# Patient Record
Sex: FEMALE | Race: OTHER | HISPANIC OR LATINO
[De-identification: names, ages, dates, MRNs, and addresses within clinical notes are randomized per-mention and may not be internally consistent; named-entity substitution may affect disease eponyms.]

---

## 2018-01-13 PROBLEM — M25.569 KNEE PAIN: Status: ACTIVE | Noted: 2018-01-13

## 2018-01-17 ENCOUNTER — APPOINTMENT (OUTPATIENT)
Dept: ORTHOPEDIC SURGERY | Facility: CLINIC | Age: 73
End: 2018-01-17
Payer: MEDICAID

## 2018-01-17 VITALS
TEMPERATURE: 98.4 F | DIASTOLIC BLOOD PRESSURE: 92 MMHG | SYSTOLIC BLOOD PRESSURE: 158 MMHG | BODY MASS INDEX: 34.96 KG/M2 | HEIGHT: 62 IN | HEART RATE: 83 BPM | WEIGHT: 190 LBS

## 2018-01-17 DIAGNOSIS — M25.569 PAIN IN UNSPECIFIED KNEE: ICD-10-CM

## 2018-01-17 PROCEDURE — 99205 OFFICE O/P NEW HI 60 MIN: CPT

## 2018-01-17 PROCEDURE — 73562 X-RAY EXAM OF KNEE 3: CPT | Mod: RT

## 2018-01-29 ENCOUNTER — OUTPATIENT (OUTPATIENT)
Dept: OUTPATIENT SERVICES | Facility: HOSPITAL | Age: 73
LOS: 1 days | End: 2018-01-29
Payer: COMMERCIAL

## 2018-01-29 VITALS
DIASTOLIC BLOOD PRESSURE: 82 MMHG | HEART RATE: 68 BPM | SYSTOLIC BLOOD PRESSURE: 146 MMHG | RESPIRATION RATE: 16 BRPM | HEIGHT: 62 IN | TEMPERATURE: 97 F | WEIGHT: 191.58 LBS

## 2018-01-29 DIAGNOSIS — M17.12 UNILATERAL PRIMARY OSTEOARTHRITIS, LEFT KNEE: ICD-10-CM

## 2018-01-29 DIAGNOSIS — I10 ESSENTIAL (PRIMARY) HYPERTENSION: ICD-10-CM

## 2018-01-29 DIAGNOSIS — Z01.818 ENCOUNTER FOR OTHER PREPROCEDURAL EXAMINATION: ICD-10-CM

## 2018-01-29 DIAGNOSIS — Z98.41 CATARACT EXTRACTION STATUS, RIGHT EYE: Chronic | ICD-10-CM

## 2018-01-29 LAB
ALBUMIN SERPL ELPH-MCNC: 4.5 G/DL — SIGNIFICANT CHANGE UP (ref 3.3–5.2)
ALP SERPL-CCNC: 72 U/L — SIGNIFICANT CHANGE UP (ref 40–120)
ALT FLD-CCNC: 16 U/L — SIGNIFICANT CHANGE UP
ANION GAP SERPL CALC-SCNC: 15 MMOL/L — SIGNIFICANT CHANGE UP (ref 5–17)
APTT BLD: 30.6 SEC — SIGNIFICANT CHANGE UP (ref 27.5–37.4)
AST SERPL-CCNC: 18 U/L — SIGNIFICANT CHANGE UP
BASOPHILS # BLD AUTO: 0 K/UL — SIGNIFICANT CHANGE UP (ref 0–0.2)
BASOPHILS NFR BLD AUTO: 0.4 % — SIGNIFICANT CHANGE UP (ref 0–2)
BILIRUB SERPL-MCNC: 0.2 MG/DL — LOW (ref 0.4–2)
BLD GP AB SCN SERPL QL: SIGNIFICANT CHANGE UP
BUN SERPL-MCNC: 20 MG/DL — SIGNIFICANT CHANGE UP (ref 8–20)
CALCIUM SERPL-MCNC: 9.7 MG/DL — SIGNIFICANT CHANGE UP (ref 8.6–10.2)
CHLORIDE SERPL-SCNC: 101 MMOL/L — SIGNIFICANT CHANGE UP (ref 98–107)
CO2 SERPL-SCNC: 27 MMOL/L — SIGNIFICANT CHANGE UP (ref 22–29)
CREAT SERPL-MCNC: 0.79 MG/DL — SIGNIFICANT CHANGE UP (ref 0.5–1.3)
EOSINOPHIL # BLD AUTO: 0.1 K/UL — SIGNIFICANT CHANGE UP (ref 0–0.5)
EOSINOPHIL NFR BLD AUTO: 1.7 % — SIGNIFICANT CHANGE UP (ref 0–6)
GLUCOSE SERPL-MCNC: 119 MG/DL — HIGH (ref 70–115)
HCT VFR BLD CALC: 40.6 % — SIGNIFICANT CHANGE UP (ref 37–47)
HGB BLD-MCNC: 13.7 G/DL — SIGNIFICANT CHANGE UP (ref 12–16)
INR BLD: 1.01 RATIO — SIGNIFICANT CHANGE UP (ref 0.88–1.16)
LYMPHOCYTES # BLD AUTO: 2 K/UL — SIGNIFICANT CHANGE UP (ref 1–4.8)
LYMPHOCYTES # BLD AUTO: 26.4 % — SIGNIFICANT CHANGE UP (ref 20–55)
MCHC RBC-ENTMCNC: 29 PG — SIGNIFICANT CHANGE UP (ref 27–31)
MCHC RBC-ENTMCNC: 33.7 G/DL — SIGNIFICANT CHANGE UP (ref 32–36)
MCV RBC AUTO: 86 FL — SIGNIFICANT CHANGE UP (ref 81–99)
MONOCYTES # BLD AUTO: 0.6 K/UL — SIGNIFICANT CHANGE UP (ref 0–0.8)
MONOCYTES NFR BLD AUTO: 7.1 % — SIGNIFICANT CHANGE UP (ref 3–10)
MRSA PCR RESULT.: SIGNIFICANT CHANGE UP
NEUTROPHILS # BLD AUTO: 5 K/UL — SIGNIFICANT CHANGE UP (ref 1.8–8)
NEUTROPHILS NFR BLD AUTO: 64.3 % — SIGNIFICANT CHANGE UP (ref 37–73)
PLATELET # BLD AUTO: 298 K/UL — SIGNIFICANT CHANGE UP (ref 150–400)
POTASSIUM SERPL-MCNC: 4.1 MMOL/L — SIGNIFICANT CHANGE UP (ref 3.5–5.3)
POTASSIUM SERPL-SCNC: 4.1 MMOL/L — SIGNIFICANT CHANGE UP (ref 3.5–5.3)
PROT SERPL-MCNC: 7.9 G/DL — SIGNIFICANT CHANGE UP (ref 6.6–8.7)
PROTHROM AB SERPL-ACNC: 11.1 SEC — SIGNIFICANT CHANGE UP (ref 9.8–12.7)
RBC # BLD: 4.72 M/UL — SIGNIFICANT CHANGE UP (ref 4.4–5.2)
RBC # FLD: 13.7 % — SIGNIFICANT CHANGE UP (ref 11–15.6)
S AUREUS DNA NOSE QL NAA+PROBE: SIGNIFICANT CHANGE UP
SODIUM SERPL-SCNC: 143 MMOL/L — SIGNIFICANT CHANGE UP (ref 135–145)
TYPE + AB SCN PNL BLD: SIGNIFICANT CHANGE UP
WBC # BLD: 7.8 K/UL — SIGNIFICANT CHANGE UP (ref 4.8–10.8)
WBC # FLD AUTO: 7.8 K/UL — SIGNIFICANT CHANGE UP (ref 4.8–10.8)

## 2018-01-29 PROCEDURE — 87640 STAPH A DNA AMP PROBE: CPT

## 2018-01-29 PROCEDURE — 86850 RBC ANTIBODY SCREEN: CPT

## 2018-01-29 PROCEDURE — 86901 BLOOD TYPING SEROLOGIC RH(D): CPT

## 2018-01-29 PROCEDURE — 85027 COMPLETE CBC AUTOMATED: CPT

## 2018-01-29 PROCEDURE — 93005 ELECTROCARDIOGRAM TRACING: CPT

## 2018-01-29 PROCEDURE — 93010 ELECTROCARDIOGRAM REPORT: CPT

## 2018-01-29 PROCEDURE — G0463: CPT

## 2018-01-29 PROCEDURE — 87641 MR-STAPH DNA AMP PROBE: CPT

## 2018-01-29 PROCEDURE — 85610 PROTHROMBIN TIME: CPT

## 2018-01-29 PROCEDURE — 85730 THROMBOPLASTIN TIME PARTIAL: CPT

## 2018-01-29 PROCEDURE — 86900 BLOOD TYPING SEROLOGIC ABO: CPT

## 2018-01-29 PROCEDURE — 36415 COLL VENOUS BLD VENIPUNCTURE: CPT

## 2018-01-29 PROCEDURE — 80053 COMPREHEN METABOLIC PANEL: CPT

## 2018-01-29 NOTE — H&P PST ADULT - NSANTHOSAYNRD_GEN_A_CORE
No. ERI screening performed.  STOP BANG Legend: 0-2 = LOW Risk; 3-4 = INTERMEDIATE Risk; 5-8 = HIGH Risk

## 2018-01-29 NOTE — PATIENT PROFILE ADULT. - LEARNING ASSESSMENT (PATIENT) ADDITIONAL COMMENTS
pre-op instructions, surgical wash, MRSA/MSSA & pain management reviewed. Pt verbalized understanding . Written material provided in Thai

## 2018-02-04 RX ORDER — OXYCODONE HYDROCHLORIDE 5 MG/1
10 TABLET ORAL ONCE
Qty: 0 | Refills: 0 | Status: DISCONTINUED | OUTPATIENT
Start: 2018-02-12 | End: 2018-02-12

## 2018-02-04 RX ORDER — GABAPENTIN 400 MG/1
600 CAPSULE ORAL ONCE
Qty: 0 | Refills: 0 | Status: COMPLETED | OUTPATIENT
Start: 2018-02-12 | End: 2018-02-12

## 2018-02-07 ENCOUNTER — APPOINTMENT (OUTPATIENT)
Dept: CARDIOLOGY | Facility: CLINIC | Age: 73
End: 2018-02-07
Payer: MEDICAID

## 2018-02-07 VITALS — RESPIRATION RATE: 16 BRPM | SYSTOLIC BLOOD PRESSURE: 160 MMHG | HEART RATE: 77 BPM | DIASTOLIC BLOOD PRESSURE: 82 MMHG

## 2018-02-07 VITALS — HEIGHT: 61 IN | BODY MASS INDEX: 37 KG/M2 | WEIGHT: 196 LBS

## 2018-02-07 DIAGNOSIS — Z01.810 ENCOUNTER FOR PREPROCEDURAL CARDIOVASCULAR EXAMINATION: ICD-10-CM

## 2018-02-07 PROCEDURE — 99205 OFFICE O/P NEW HI 60 MIN: CPT

## 2018-02-07 PROCEDURE — 93000 ELECTROCARDIOGRAM COMPLETE: CPT

## 2018-02-07 PROCEDURE — 93306 TTE W/DOPPLER COMPLETE: CPT

## 2018-02-11 ENCOUNTER — FORM ENCOUNTER (OUTPATIENT)
Age: 73
End: 2018-02-11

## 2018-02-12 ENCOUNTER — TRANSCRIPTION ENCOUNTER (OUTPATIENT)
Age: 73
End: 2018-02-12

## 2018-02-12 ENCOUNTER — APPOINTMENT (OUTPATIENT)
Dept: ORTHOPEDIC SURGERY | Facility: HOSPITAL | Age: 73
End: 2018-02-12

## 2018-02-12 ENCOUNTER — INPATIENT (INPATIENT)
Facility: HOSPITAL | Age: 73
LOS: 1 days | Discharge: HOME | DRG: 470 | End: 2018-02-14
Attending: ORTHOPAEDIC SURGERY | Admitting: ORTHOPAEDIC SURGERY
Payer: COMMERCIAL

## 2018-02-12 VITALS
HEART RATE: 76 BPM | SYSTOLIC BLOOD PRESSURE: 165 MMHG | TEMPERATURE: 98 F | HEIGHT: 61 IN | DIASTOLIC BLOOD PRESSURE: 88 MMHG | WEIGHT: 190.04 LBS | OXYGEN SATURATION: 100 %

## 2018-02-12 DIAGNOSIS — Z98.41 CATARACT EXTRACTION STATUS, RIGHT EYE: Chronic | ICD-10-CM

## 2018-02-12 DIAGNOSIS — M17.12 UNILATERAL PRIMARY OSTEOARTHRITIS, LEFT KNEE: ICD-10-CM

## 2018-02-12 DIAGNOSIS — Z00.00 ENCOUNTER FOR GENERAL ADULT MEDICAL EXAMINATION WITHOUT ABNORMAL FINDINGS: ICD-10-CM

## 2018-02-12 DIAGNOSIS — Z29.9 ENCOUNTER FOR PROPHYLACTIC MEASURES, UNSPECIFIED: ICD-10-CM

## 2018-02-12 DIAGNOSIS — Z96.652 PRESENCE OF LEFT ARTIFICIAL KNEE JOINT: ICD-10-CM

## 2018-02-12 DIAGNOSIS — I10 ESSENTIAL (PRIMARY) HYPERTENSION: ICD-10-CM

## 2018-02-12 DIAGNOSIS — H26.9 UNSPECIFIED CATARACT: ICD-10-CM

## 2018-02-12 LAB — ABO RH CONFIRMATION: SIGNIFICANT CHANGE UP

## 2018-02-12 PROCEDURE — 27447 TOTAL KNEE ARTHROPLASTY: CPT | Mod: AS,LT

## 2018-02-12 PROCEDURE — 27447 TOTAL KNEE ARTHROPLASTY: CPT | Mod: LT

## 2018-02-12 PROCEDURE — 88311 DECALCIFY TISSUE: CPT | Mod: 26

## 2018-02-12 PROCEDURE — 73560 X-RAY EXAM OF KNEE 1 OR 2: CPT | Mod: 26,LT

## 2018-02-12 PROCEDURE — 88305 TISSUE EXAM BY PATHOLOGIST: CPT | Mod: 26

## 2018-02-12 PROCEDURE — 20985 CPTR-ASST DIR MS PX: CPT

## 2018-02-12 PROCEDURE — 99232 SBSQ HOSP IP/OBS MODERATE 35: CPT

## 2018-02-12 PROCEDURE — 20985 CPTR-ASST DIR MS PX: CPT | Mod: AS

## 2018-02-12 RX ORDER — ACETAMINOPHEN 500 MG
975 TABLET ORAL EVERY 8 HOURS
Qty: 0 | Refills: 0 | Status: DISCONTINUED | OUTPATIENT
Start: 2018-02-12 | End: 2018-02-14

## 2018-02-12 RX ORDER — ASCORBIC ACID 60 MG
500 TABLET,CHEWABLE ORAL
Qty: 0 | Refills: 0 | Status: DISCONTINUED | OUTPATIENT
Start: 2018-02-12 | End: 2018-02-14

## 2018-02-12 RX ORDER — ONDANSETRON 8 MG/1
4 TABLET, FILM COATED ORAL ONCE
Qty: 0 | Refills: 0 | Status: DISCONTINUED | OUTPATIENT
Start: 2018-02-12 | End: 2018-02-12

## 2018-02-12 RX ORDER — VANCOMYCIN HCL 1 G
1250 VIAL (EA) INTRAVENOUS
Qty: 0 | Refills: 0 | Status: COMPLETED | OUTPATIENT
Start: 2018-02-12 | End: 2018-02-13

## 2018-02-12 RX ORDER — VALSARTAN 80 MG/1
160 TABLET ORAL DAILY
Qty: 0 | Refills: 0 | Status: DISCONTINUED | OUTPATIENT
Start: 2018-02-12 | End: 2018-02-14

## 2018-02-12 RX ORDER — SODIUM CHLORIDE 9 MG/ML
1000 INJECTION, SOLUTION INTRAVENOUS
Qty: 0 | Refills: 0 | Status: DISCONTINUED | OUTPATIENT
Start: 2018-02-12 | End: 2018-02-14

## 2018-02-12 RX ORDER — DOCUSATE SODIUM 100 MG
100 CAPSULE ORAL THREE TIMES A DAY
Qty: 0 | Refills: 0 | Status: DISCONTINUED | OUTPATIENT
Start: 2018-02-12 | End: 2018-02-14

## 2018-02-12 RX ORDER — CEFAZOLIN SODIUM 1 G
2000 VIAL (EA) INJECTION
Qty: 0 | Refills: 0 | Status: COMPLETED | OUTPATIENT
Start: 2018-02-12 | End: 2018-02-13

## 2018-02-12 RX ORDER — OXYCODONE HYDROCHLORIDE 5 MG/1
10 TABLET ORAL
Qty: 0 | Refills: 0 | Status: DISCONTINUED | OUTPATIENT
Start: 2018-02-12 | End: 2018-02-14

## 2018-02-12 RX ORDER — OXYCODONE HYDROCHLORIDE 5 MG/1
5 TABLET ORAL
Qty: 0 | Refills: 0 | Status: DISCONTINUED | OUTPATIENT
Start: 2018-02-12 | End: 2018-02-14

## 2018-02-12 RX ORDER — MAGNESIUM HYDROXIDE 400 MG/1
30 TABLET, CHEWABLE ORAL DAILY
Qty: 0 | Refills: 0 | Status: DISCONTINUED | OUTPATIENT
Start: 2018-02-12 | End: 2018-02-14

## 2018-02-12 RX ORDER — SENNA PLUS 8.6 MG/1
2 TABLET ORAL AT BEDTIME
Qty: 0 | Refills: 0 | Status: DISCONTINUED | OUTPATIENT
Start: 2018-02-12 | End: 2018-02-14

## 2018-02-12 RX ORDER — SODIUM CHLORIDE 9 MG/ML
1000 INJECTION, SOLUTION INTRAVENOUS
Qty: 0 | Refills: 0 | Status: DISCONTINUED | OUTPATIENT
Start: 2018-02-12 | End: 2018-02-12

## 2018-02-12 RX ORDER — TRANEXAMIC ACID 100 MG/ML
870 INJECTION, SOLUTION INTRAVENOUS ONCE
Qty: 0 | Refills: 0 | Status: DISCONTINUED | OUTPATIENT
Start: 2018-02-12 | End: 2018-02-12

## 2018-02-12 RX ORDER — ACETAMINOPHEN 500 MG
1000 TABLET ORAL
Qty: 0 | Refills: 0 | Status: COMPLETED | OUTPATIENT
Start: 2018-02-12 | End: 2018-02-12

## 2018-02-12 RX ORDER — ASPIRIN/CALCIUM CARB/MAGNESIUM 324 MG
325 TABLET ORAL
Qty: 0 | Refills: 0 | Status: DISCONTINUED | OUTPATIENT
Start: 2018-02-12 | End: 2018-02-14

## 2018-02-12 RX ORDER — OXYCODONE HYDROCHLORIDE 5 MG/1
10 TABLET ORAL EVERY 12 HOURS
Qty: 0 | Refills: 0 | Status: DISCONTINUED | OUTPATIENT
Start: 2018-02-12 | End: 2018-02-14

## 2018-02-12 RX ORDER — ACETAMINOPHEN 500 MG
1000 TABLET ORAL ONCE
Qty: 0 | Refills: 0 | Status: DISCONTINUED | OUTPATIENT
Start: 2018-02-12 | End: 2018-02-12

## 2018-02-12 RX ORDER — OFLOXACIN 0.3 %
1 DROPS OPHTHALMIC (EYE) DAILY
Qty: 0 | Refills: 0 | Status: DISCONTINUED | OUTPATIENT
Start: 2018-02-12 | End: 2018-02-13

## 2018-02-12 RX ORDER — AMLODIPINE BESYLATE 2.5 MG/1
5 TABLET ORAL DAILY
Qty: 0 | Refills: 0 | Status: DISCONTINUED | OUTPATIENT
Start: 2018-02-12 | End: 2018-02-14

## 2018-02-12 RX ORDER — ONDANSETRON 8 MG/1
4 TABLET, FILM COATED ORAL EVERY 6 HOURS
Qty: 0 | Refills: 0 | Status: DISCONTINUED | OUTPATIENT
Start: 2018-02-12 | End: 2018-02-14

## 2018-02-12 RX ORDER — FERROUS SULFATE 325(65) MG
325 TABLET ORAL DAILY
Qty: 0 | Refills: 0 | Status: DISCONTINUED | OUTPATIENT
Start: 2018-02-12 | End: 2018-02-14

## 2018-02-12 RX ORDER — HYDROMORPHONE HYDROCHLORIDE 2 MG/ML
0.5 INJECTION INTRAMUSCULAR; INTRAVENOUS; SUBCUTANEOUS
Qty: 0 | Refills: 0 | Status: DISCONTINUED | OUTPATIENT
Start: 2018-02-12 | End: 2018-02-14

## 2018-02-12 RX ORDER — POLYETHYLENE GLYCOL 3350 17 G/17G
17 POWDER, FOR SOLUTION ORAL DAILY
Qty: 0 | Refills: 0 | Status: DISCONTINUED | OUTPATIENT
Start: 2018-02-12 | End: 2018-02-14

## 2018-02-12 RX ORDER — CEFAZOLIN SODIUM 1 G
2000 VIAL (EA) INJECTION ONCE
Qty: 0 | Refills: 0 | Status: DISCONTINUED | OUTPATIENT
Start: 2018-02-12 | End: 2018-02-12

## 2018-02-12 RX ORDER — CELECOXIB 200 MG/1
400 CAPSULE ORAL ONCE
Qty: 0 | Refills: 0 | Status: COMPLETED | OUTPATIENT
Start: 2018-02-12 | End: 2018-02-12

## 2018-02-12 RX ORDER — FENTANYL CITRATE 50 UG/ML
50 INJECTION INTRAVENOUS
Qty: 0 | Refills: 0 | Status: DISCONTINUED | OUTPATIENT
Start: 2018-02-12 | End: 2018-02-12

## 2018-02-12 RX ORDER — VANCOMYCIN HCL 1 G
1250 VIAL (EA) INTRAVENOUS ONCE
Qty: 0 | Refills: 0 | Status: COMPLETED | OUTPATIENT
Start: 2018-02-12 | End: 2018-02-12

## 2018-02-12 RX ORDER — FOLIC ACID 0.8 MG
1 TABLET ORAL DAILY
Qty: 0 | Refills: 0 | Status: DISCONTINUED | OUTPATIENT
Start: 2018-02-12 | End: 2018-02-14

## 2018-02-12 RX ORDER — CELECOXIB 200 MG/1
200 CAPSULE ORAL
Qty: 0 | Refills: 0 | Status: DISCONTINUED | OUTPATIENT
Start: 2018-02-14 | End: 2018-02-14

## 2018-02-12 RX ORDER — BUPIVACAINE 13.3 MG/ML
20 INJECTION, SUSPENSION, LIPOSOMAL INFILTRATION ONCE
Qty: 0 | Refills: 0 | Status: DISCONTINUED | OUTPATIENT
Start: 2018-02-12 | End: 2018-02-12

## 2018-02-12 RX ADMIN — CELECOXIB 400 MILLIGRAM(S): 200 CAPSULE ORAL at 11:25

## 2018-02-12 RX ADMIN — Medication 500 MILLIGRAM(S): at 19:17

## 2018-02-12 RX ADMIN — Medication 100 MILLIGRAM(S): at 23:16

## 2018-02-12 RX ADMIN — OXYCODONE HYDROCHLORIDE 10 MILLIGRAM(S): 5 TABLET ORAL at 11:24

## 2018-02-12 RX ADMIN — CELECOXIB 400 MILLIGRAM(S): 200 CAPSULE ORAL at 11:24

## 2018-02-12 RX ADMIN — OXYCODONE HYDROCHLORIDE 10 MILLIGRAM(S): 5 TABLET ORAL at 19:17

## 2018-02-12 RX ADMIN — GABAPENTIN 600 MILLIGRAM(S): 400 CAPSULE ORAL at 11:24

## 2018-02-12 RX ADMIN — Medication 400 MILLIGRAM(S): at 23:16

## 2018-02-12 RX ADMIN — Medication 325 MILLIGRAM(S): at 19:17

## 2018-02-12 RX ADMIN — Medication 100 MILLIGRAM(S): at 20:03

## 2018-02-12 RX ADMIN — OXYCODONE HYDROCHLORIDE 10 MILLIGRAM(S): 5 TABLET ORAL at 19:19

## 2018-02-12 RX ADMIN — Medication 166.67 MILLIGRAM(S): at 12:00

## 2018-02-12 NOTE — CONSULT NOTE ADULT - SUBJECTIVE AND OBJECTIVE BOX
PMD :  Cardio :     Patient is a 72y old  Female who presents with a chief complaint of L knee pain /oa for elective L TKA     CC: L knee pain     HPI:  73 yo female with left knee pain for many years,  S/P L TKA , POD # 0 , seen on PACU     PAST MEDICAL & SURGICAL HISTORY:  Risk factors for obstructive sleep apnea  OA (osteoarthritis)  Hypertension  S/P cataract extraction, right     Social History:  · Marital Status	Single  · Lives With	children     Substance Use History:  · Substance Use	caffeine  · Caffeine Type	coffee  · Caffeine Amount/Frequency	1-2 cups/cans per day     Alcohol Use History:  · Have you ever consumed alcohol	never     Tobacco Usage:  · Tobacco Usage: Never smoker    FAMILY HISTORY:  No pertinent family history in first degree relatives      Allergies    No Known Allergies        HOME MEDICATIONS :   · 	valsartan 160 mg oral tablet: Last Dose Taken:  , 1 tab(s) orally once a day  · 	amLODIPine 5 mg oral tablet: Last Dose Taken:  , 1 tab(s) orally once a day  · 	PREDNISOLONE ROVERTO 1% OP: Last Dose Taken:    · 	OFLOXACIN    SPENCER 0.3% OP: Last Dose Taken:    · 	Omega-3 oral capsule: Last Dose Taken:  , orally once a day    REVIEW OF SYSTEMS:    CONSTITUTIONAL: No fever, weight loss, or fatigue  EYES: No eye pain, visual disturbances, or discharge  NECK: No pain or stiffness  RESPIRATORY: No cough, wheezing, chills or hemoptysis; No shortness of breath  CARDIOVASCULAR: No chest pain, palpitations, dizziness, or leg swelling  GASTROINTESTINAL: No abdominal or epigastric pain. No nausea, vomiting, or hematemesis; No diarrhea or constipation. No melena or hematochezia.  GENITOURINARY: No dysuria, frequency, hematuria, or incontinence  NEUROLOGICAL: No headaches, memory loss, loss of strength, numbness, or tremors  SKIN: No itching, burning, rashes, or lesions   LYMPH NODES: No enlarged glands  ENDOCRINE: No heat or cold intolerance; No hair loss  MUSCULOSKELETAL: L knee pain  PSYCHIATRIC: No depression, anxiety, mood swings, or difficulty sleeping  HEME/LYMPH: No easy bruising, or bleeding gums  ALLERGY AND IMMUNOLOGIC: No hives or eczema    MEDICATIONS  (STANDING):  ceFAZolin   IVPB 2000 milliGRAM(s) IV Intermittent <User Schedule>  lactated ringers. 1000 milliLiter(s) (125 mL/Hr) IV Continuous <Continuous>  vancomycin  IVPB 1250 milliGRAM(s) IV Intermittent <User Schedule>    MEDICATIONS  (PRN):  fentaNYL    Injectable 50 MICROGram(s) IV Push every 5 minutes PRN Severe Pain  ondansetron Injectable 4 milliGRAM(s) IV Push once PRN Nausea and/or Vomiting  promethazine IVPB 6.25 milliGRAM(s) IV Intermittent once PRN Nausea and/or Vomiting      Vital Signs Last 24 Hrs  T(C): 36.3 (12 Feb 2018 14:15), Max: 36.8 (12 Feb 2018 10:43)  T(F): 97.3 (12 Feb 2018 14:15), Max: 98.2 (12 Feb 2018 10:43)  HR: 81 (12 Feb 2018 14:45) (76 - 81)  BP: 106/54 (12 Feb 2018 14:45) (85/42 - 165/88)  BP(mean): --  RR: 19 (12 Feb 2018 14:45) (16 - 20)  SpO2: 98% (12 Feb 2018 14:45) (98% - 100%)    PHYSICAL EXAM:    GENERAL: NAD, well-groomed, well-developed  HEAD:  Atraumatic, Normocephalic  EYES: EOMI, PERRLA, conjunctiva and sclera clear  NECK: Supple, No JVD, Normal thyroid  NERVOUS SYSTEM:  Alert & Oriented X3, Good concentration; Motor Strength 5/5 B/L upper and lower extremities; DTRs 2+ intact and symmetric  CHEST/LUNG: CTA  b/l,  no rales, rhonchi, wheezing, or rubs  HEART: Regular rate and rhythm; No murmurs, rubs, or gallops  ABDOMEN: Soft, Nontender, Nondistended; Bowel sounds present  EXTREMITIES:  2+ Peripheral Pulses, No clubbing, cyanosis, or edema , L knee dressing + , clean and dry   LYMPH: No lymphadenopathy noted  SKIN: No rashes or lesions    LABS: Pending    RADIOLOGY & ADDITIONAL STUDIES: PMD : Ray   Cardio : Sedrick    Patient is a 72y old  Female who presents with a chief complaint of L knee pain /oa for elective L TKA     CC: L knee pain     HPI:  73 yo female  Armenian speaking only with left knee pain for many years, had cortisone inj in the past , was taking over counter pain meds and failed conservative mgmt , now S/P L TKA , POD # 0 , seen on PACU     PAST MEDICAL & SURGICAL HISTORY:  Risk factors for obstructive sleep apnea  OA (osteoarthritis)  Hypertension  S/P cataract extraction, right     Social History:  · Marital Status	Single  · Lives With	children     Substance Use History:  · Substance Use	caffeine  · Caffeine Type	coffee  · Caffeine Amount/Frequency	1-2 cups/cans per day     Alcohol Use History:  · Have you ever consumed alcohol	never     Tobacco Usage:  · Tobacco Usage: Never smoker    FAMILY HISTORY:  No pertinent family history in first degree relatives      Allergies    No Known Allergies        HOME MEDICATIONS :   · 	valsartan 160 mg oral tablet: Last Dose Taken:  , 1 tab(s) orally once a day  · 	amLODIPine 5 mg oral tablet: Last Dose Taken:  , 1 tab(s) orally once a day  · 	PREDNISOLONE ROVERTO 1% OP: Last Dose Taken:    · 	OFLOXACIN    SPENCER 0.3% OP: Last Dose Taken:    · 	Omega-3 oral capsule: Last Dose Taken:  , orally once a day    REVIEW OF SYSTEMS:    CONSTITUTIONAL: No fever, weight loss, or fatigue  EYES: No eye pain, visual disturbances, or discharge  NECK: No pain or stiffness  RESPIRATORY: No cough, wheezing, chills or hemoptysis; No shortness of breath  CARDIOVASCULAR: No chest pain, palpitations, dizziness, or leg swelling  GASTROINTESTINAL: No abdominal or epigastric pain. No nausea, vomiting, or hematemesis; No diarrhea or constipation. No melena or hematochezia.  GENITOURINARY: No dysuria, frequency, hematuria, or incontinence  NEUROLOGICAL: No headaches, memory loss, loss of strength, numbness, or tremors  SKIN: No itching, burning, rashes, or lesions   LYMPH NODES: No enlarged glands  ENDOCRINE: No heat or cold intolerance; No hair loss  MUSCULOSKELETAL: L knee pain  PSYCHIATRIC: No depression, anxiety, mood swings, or difficulty sleeping  HEME/LYMPH: No easy bruising, or bleeding gums  ALLERGY AND IMMUNOLOGIC: No hives or eczema    MEDICATIONS  (STANDING):  ceFAZolin   IVPB 2000 milliGRAM(s) IV Intermittent <User Schedule>  lactated ringers. 1000 milliLiter(s) (125 mL/Hr) IV Continuous <Continuous>  vancomycin  IVPB 1250 milliGRAM(s) IV Intermittent <User Schedule>    MEDICATIONS  (PRN):  fentaNYL    Injectable 50 MICROGram(s) IV Push every 5 minutes PRN Severe Pain  ondansetron Injectable 4 milliGRAM(s) IV Push once PRN Nausea and/or Vomiting  promethazine IVPB 6.25 milliGRAM(s) IV Intermittent once PRN Nausea and/or Vomiting      Vital Signs Last 24 Hrs  T(C): 36.3 (12 Feb 2018 14:15), Max: 36.8 (12 Feb 2018 10:43)  T(F): 97.3 (12 Feb 2018 14:15), Max: 98.2 (12 Feb 2018 10:43)  HR: 81 (12 Feb 2018 14:45) (76 - 81)  BP: 106/54 (12 Feb 2018 14:45) (85/42 - 165/88)  BP(mean): --  RR: 19 (12 Feb 2018 14:45) (16 - 20)  SpO2: 98% (12 Feb 2018 14:45) (98% - 100%)    PHYSICAL EXAM:    GENERAL: NAD, well-groomed, well-developed  HEAD:  Atraumatic, Normocephalic  EYES: EOMI, PERRLA, conjunctiva and sclera clear  NECK: Supple, No JVD, Normal thyroid  NERVOUS SYSTEM:  Alert & Oriented X3, Good concentration; Motor Strength 5/5 B/L upper and lower extremities; DTRs 2+ intact and symmetric  CHEST/LUNG: CTA  b/l,  no rales, rhonchi, wheezing, or rubs  HEART: Regular rate and rhythm; No murmurs, rubs, or gallops  ABDOMEN: Soft, Nontender, Nondistended; Bowel sounds present  EXTREMITIES:  2+ Peripheral Pulses, No clubbing, cyanosis, or edema , L knee dressing + , clean and dry   LYMPH: No lymphadenopathy noted  SKIN: No rashes or lesions    LABS: Pending    RADIOLOGY & ADDITIONAL STUDIES:    < from: Xray Knee 1 or 2 Views, Left (02.12.18 @ 14:53) >     EXAM:  KNEE-LEFT                          PROCEDURE DATE:  02/12/2018          INTERPRETATION:  Left knee    Portable AP and lateral views.    Postoperative evaluation. Status post left knee replacement.    The prosthetic components are in good position. The alignment is normal.   Gas and fluid is present in the suprapatellar recess.    Impression: Left total knee replacement in good position                JOSUE HANSON M.D., ATTENDING RADIOLOGIST  This document has been electronically signed. Feb 12 2018  3:21PM    < end of copied text > PMD : Ray   Cardio : Sedrick    Patient is a 72y old  Female who presents with a chief complaint of L knee pain /oa for elective L TKA     CC: L knee pain     HPI:  73 yo female  Kyrgyz speaking only( Ms Jacklyn Gibson -  helped with translation )   with left knee pain for many years, had  several cortisone inj in the past , was taking over counter pain meds and failed conservative mgmt , now S/P L TKA , POD # 0 , seen on PACU     PAST MEDICAL & SURGICAL HISTORY:  Risk factors for obstructive sleep apnea  OA (osteoarthritis)  Hypertension  b/l cataract   S/P cataract extraction, right ( 1/19 )     Social History:  · Marital Status	  · Lives With	children     Substance Use History:  · Substance Use	caffeine  · Caffeine Type	coffee  · Caffeine Amount/Frequency	1-2 cups/cans per day     Alcohol Use History:  · Have you ever consumed alcohol	never     Tobacco Usage:  · Tobacco Usage: Never smoker    FAMILY HISTORY:  Denies DM / Cancer / Heart DZ       Allergies    No Known Allergies        HOME MEDICATIONS :   · 	valsartan 160 mg oral tablet: Last Dose Taken:  , 1 tab(s) orally once a day  · 	amLODIPine 5 mg oral tablet: Last Dose Taken:  , 1 tab(s) orally once a day  · 	PREDNISOLONE ROVERTO 1% OP: Last Dose Taken:  not sure how she takes  · 	OFLOXACIN    SPENCER 0.3% OP: Last Dose Taken:  not sure how she takes  · 	Omega-3 oral capsule: Last Dose Taken:  , orally once a day    REVIEW OF SYSTEMS:    CONSTITUTIONAL: No fever, weight loss, or fatigue  EYES: No eye pain, visual disturbances, or discharge  NECK: No pain or stiffness  RESPIRATORY: No cough, wheezing, chills or hemoptysis; No shortness of breath  CARDIOVASCULAR: No chest pain, palpitations, dizziness, or leg swelling  GASTROINTESTINAL: No abdominal or epigastric pain. No nausea, vomiting, or hematemesis; No diarrhea or constipation. No melena or hematochezia.  GENITOURINARY: No dysuria, frequency, hematuria, or incontinence  NEUROLOGICAL: No headaches, memory loss, loss of strength, numbness, or tremors  SKIN: No itching, burning, rashes, or lesions   LYMPH NODES: No enlarged glands  ENDOCRINE: No heat or cold intolerance; No hair loss  MUSCULOSKELETAL: L knee pain  PSYCHIATRIC: No depression, anxiety, mood swings, or difficulty sleeping  HEME/LYMPH: No easy bruising, or bleeding gums  ALLERGY AND IMMUNOLOGIC: No hives or eczema    MEDICATIONS  (STANDING):  ceFAZolin   IVPB 2000 milliGRAM(s) IV Intermittent <User Schedule>  lactated ringers. 1000 milliLiter(s) (125 mL/Hr) IV Continuous <Continuous>  vancomycin  IVPB 1250 milliGRAM(s) IV Intermittent <User Schedule>    MEDICATIONS  (PRN):  fentaNYL    Injectable 50 MICROGram(s) IV Push every 5 minutes PRN Severe Pain  ondansetron Injectable 4 milliGRAM(s) IV Push once PRN Nausea and/or Vomiting  promethazine IVPB 6.25 milliGRAM(s) IV Intermittent once PRN Nausea and/or Vomiting      Vital Signs Last 24 Hrs  T(C): 36.3 (12 Feb 2018 14:15), Max: 36.8 (12 Feb 2018 10:43)  T(F): 97.3 (12 Feb 2018 14:15), Max: 98.2 (12 Feb 2018 10:43)  HR: 81 (12 Feb 2018 14:45) (76 - 81)  BP: 106/54 (12 Feb 2018 14:45) (85/42 - 165/88)  BP(mean): --  RR: 19 (12 Feb 2018 14:45) (16 - 20)  SpO2: 98% (12 Feb 2018 14:45) (98% - 100%)    PHYSICAL EXAM:    GENERAL: NAD, well-groomed, well-developed  HEAD:  Atraumatic, Normocephalic  EYES: EOMI, PERRLA, conjunctiva and sclera clear  NECK: Supple, No JVD, Normal thyroid  NERVOUS SYSTEM:  Alert & Oriented X3, Good concentration; Motor Strength 5/5 B/L upper and lower extremities; DTRs 2+ intact and symmetric  CHEST/LUNG: CTA  b/l,  no rales, rhonchi, wheezing, or rubs  HEART: Regular rate and rhythm; No murmurs, rubs, or gallops  ABDOMEN: Soft, Nontender, Nondistended; Bowel sounds present  EXTREMITIES:  2+ Peripheral Pulses, No clubbing, cyanosis, or edema , L knee dressing + , clean and dry   LYMPH: No lymphadenopathy noted  SKIN: No rashes or lesions    LABS: Pending    RADIOLOGY & ADDITIONAL STUDIES:    < from: Xray Knee 1 or 2 Views, Left (02.12.18 @ 14:53) >     EXAM:  KNEE-LEFT                          PROCEDURE DATE:  02/12/2018          INTERPRETATION:  Left knee    Portable AP and lateral views.    Postoperative evaluation. Status post left knee replacement.    The prosthetic components are in good position. The alignment is normal.   Gas and fluid is present in the suprapatellar recess.    Impression: Left total knee replacement in good position                JOSUE HANSON M.D., ATTENDING RADIOLOGIST  This document has been electronically signed. Feb 12 2018  3:21PM    < end of copied text >

## 2018-02-12 NOTE — DISCHARGE NOTE ADULT - HAS THE PATIENT RECEIVED THE INFLUENZA VACCINE DURING THIS VISIT
as per pt she received flu shot in [facility]/Yes as per pt she received flu shot in [facility]/No as per pt she received flu shot in [facility] last December 2017/Yes

## 2018-02-12 NOTE — DISCHARGE NOTE ADULT - MEDICATION SUMMARY - MEDICATIONS TO TAKE
I will START or STAY ON the medications listed below when I get home from the hospital:    oxyCODONE 5 mg oral tablet  -- 1 tab(s) by mouth every 4 hours, As Needed -Moderate Pain (4 - 6) MDD:6  -- Indication: For Pain    aspirin 325 mg oral delayed release tablet  -- 1 tab(s) by mouth 2 times a day  -- Indication: For dvt prophalaxis    valsartan 160 mg oral tablet  -- 1 tab(s) by mouth once a day  -- Indication: For Home med    amLODIPine 5 mg oral tablet  -- 1 tab(s) by mouth once a day  -- Indication: For Home med    senna oral tablet  -- 2 tab(s) by mouth once a day (at bedtime), As needed, Constipation  -- Indication: For Stool softener    Omega-3 oral capsule  -- orally once a day  -- Indication: For Home med    PREDNISOLONE ROVERTO 1% OP  -- Indication: For Home med    OFLOXACIN    SPENCER 0.3% OP  -- Indication: For Home med

## 2018-02-12 NOTE — DISCHARGE NOTE ADULT - PATIENT PORTAL LINK FT
You can access the [facility] Portal, offered by [facility], by registering with the following website: [email or web address]

## 2018-02-12 NOTE — CONSULT NOTE ADULT - PROBLEM SELECTOR RECOMMENDATION 4
continue home meds with parameters s/p resent extraction   Cataract of left eye , unspecified type - needs extraction   continue home meds with parameters 9 family asked to bring meds to confirm frequency and dose ) s/p resent extraction   Cataract of left eye , unspecified type - needs extraction   continue home meds ( family asked to bring meds to confirm frequency and dose )

## 2018-02-12 NOTE — DISCHARGE NOTE ADULT - PLAN OF CARE
PT The patient will be seen in the office in 3 weeks for wound check. Tape will be removed at that time. Patient may shower after post-op day #5. The dressing is to be removed on post op day 10. IF THE DRESSING BECOMES SOILED BEFORE THE REMOVAL DATE, CHANGE WITH A SIMILAR DRESSING. IF THE DRESSING BECOMES STAINED WITH DISCHARGE, CONTACT THE OFFICE FOR FURTHER DIRECTIONS. The patient will contact the office if the wound becomes red, has increasing pain, develops bleeding or discharge, an injury occurs, or has other concerns. The patient will continue PT consistent with total knee replacement. The patient will continue aspirin 325mg twice daily for 6 weeks for blood clot prevention.  The patient will take OXYCODONE AND TYLENOL for pain control and titrate according to prescription and patient needs. The patient will take Colace while taking oxycodone to prevent narcotic associated constipation.  Additionally, increase water intake (drink at least 8 glasses of water daily) and try adding fiber to the diet by eating fruits, vegetables and foods that are rich in grains. If constipation is experienced, contact the medical/primary care provider to discuss further treatment options. The patient is FULL weight bearing. Elevation of the lower leg is recommended to reduce swelling.

## 2018-02-12 NOTE — PROGRESS NOTE ADULT - SUBJECTIVE AND OBJECTIVE BOX
Orthopedic PA Postop Note  Patient S/P Left TKA  Patient in bed comfortable   Left Leg  Dressing C/D/I   Pulse intact   Calf Soft NT  Dorsi/Plantar Flexion intact     Vital Signs Last 24 Hrs  T(C): 37 (12 Feb 2018 16:53), Max: 37 (12 Feb 2018 16:53)  T(F): 98.6 (12 Feb 2018 16:53), Max: 98.6 (12 Feb 2018 16:53)  HR: 91 (12 Feb 2018 16:53) (76 - 91)  BP: 122/75 (12 Feb 2018 16:53) (85/42 - 165/88)  BP(mean): --  RR: 20 (12 Feb 2018 16:53) (16 - 21)  SpO2: 88% (12 Feb 2018 16:53) (88% - 100%)    < from: Xray Knee 1 or 2 Views, Left (02.12.18 @ 14:53) >   EXAM:  KNEE-LEFT                          PROCEDURE DATE:  02/12/2018          INTERPRETATION:  Left knee    Portable AP and lateral views.    Postoperative evaluation. Status post left knee replacement.    The prosthetic components are in good position. The alignment is normal.   Gas and fluid is present in the suprapatellar recess.    Impression: Left total knee replacement in good position                JOSUE HANSON M.D., ATTENDING RADIOLOGIST  This document has been electronically signed. Feb 12 2018  3:21PM        < end of copied text >      A/P S/P Left TKA  1. DVTP - ASA  2. PT   3. Pain Control

## 2018-02-12 NOTE — DISCHARGE NOTE ADULT - CARE PLAN
Goal:	PT  Assessment and plan of treatment:	The patient will be seen in the office in 3 weeks for wound check. Tape will be removed at that time. Patient may shower after post-op day #5. The dressing is to be removed on post op day 10. IF THE DRESSING BECOMES SOILED BEFORE THE REMOVAL DATE, CHANGE WITH A SIMILAR DRESSING. IF THE DRESSING BECOMES STAINED WITH DISCHARGE, CONTACT THE OFFICE FOR FURTHER DIRECTIONS. The patient will contact the office if the wound becomes red, has increasing pain, develops bleeding or discharge, an injury occurs, or has other concerns. The patient will continue PT consistent with total knee replacement. The patient will continue aspirin 325mg twice daily for 6 weeks for blood clot prevention.  The patient will take OXYCODONE AND TYLENOL for pain control and titrate according to prescription and patient needs. The patient will take Colace while taking oxycodone to prevent narcotic associated constipation.  Additionally, increase water intake (drink at least 8 glasses of water daily) and try adding fiber to the diet by eating fruits, vegetables and foods that are rich in grains. If constipation is experienced, contact the medical/primary care provider to discuss further treatment options. The patient is FULL weight bearing. Elevation of the lower leg is recommended to reduce swelling. Principal Discharge DX:	Primary osteoarthritis of left knee  Goal:	PT  Assessment and plan of treatment:	The patient will be seen in the office in 3 weeks for wound check. Tape will be removed at that time. Patient may shower after post-op day #5. The dressing is to be removed on post op day 10. IF THE DRESSING BECOMES SOILED BEFORE THE REMOVAL DATE, CHANGE WITH A SIMILAR DRESSING. IF THE DRESSING BECOMES STAINED WITH DISCHARGE, CONTACT THE OFFICE FOR FURTHER DIRECTIONS. The patient will contact the office if the wound becomes red, has increasing pain, develops bleeding or discharge, an injury occurs, or has other concerns. The patient will continue PT consistent with total knee replacement. The patient will continue aspirin 325mg twice daily for 6 weeks for blood clot prevention.  The patient will take OXYCODONE AND TYLENOL for pain control and titrate according to prescription and patient needs. The patient will take Colace while taking oxycodone to prevent narcotic associated constipation.  Additionally, increase water intake (drink at least 8 glasses of water daily) and try adding fiber to the diet by eating fruits, vegetables and foods that are rich in grains. If constipation is experienced, contact the medical/primary care provider to discuss further treatment options. The patient is FULL weight bearing. Elevation of the lower leg is recommended to reduce swelling.

## 2018-02-12 NOTE — DISCHARGE NOTE ADULT - CARE PROVIDER_API CALL
Hernan Castillo), Orthopaedic Surgery  [address] [place] [address]  Phone: [phone]  Fax: [phone]

## 2018-02-12 NOTE — CONSULT NOTE ADULT - ASSESSMENT
73 yo female with left knee pain for many years,  S/P L TKA , POD # 0 , seen on PACU 71 yo female with left knee pain for many years, now S/P L TKA , POD # 0 , seen on PACU

## 2018-02-12 NOTE — DISCHARGE NOTE ADULT - HOSPITAL COURSE
The patient underwent a Left TOTAL KNEE REPLACEMENT on 2/12/18. The patient received antibiotics consistent with SCIP guidelines. The patient underwent the procedure and had no intra-operative complications. Post-operatively, the patient was seen by medicine and PT. The patient received ASA for DVTP. The patient received pain medications per orthopedic pain managment protocol and the pain was appropriately controlled. The patient did not have any post-operative medical complications. The patient was discharged in stable condition.

## 2018-02-12 NOTE — PHYSICAL THERAPY INITIAL EVALUATION ADULT - ADDITIONAL COMMENTS
per patient, she lives with her son and has 3 steps to enter with a single hand rail. Her sister plants on coming by very often when D/C'd home however pt will require DME.

## 2018-02-13 ENCOUNTER — TRANSCRIPTION ENCOUNTER (OUTPATIENT)
Age: 73
End: 2018-02-13

## 2018-02-13 LAB
ANION GAP SERPL CALC-SCNC: 14 MMOL/L — SIGNIFICANT CHANGE UP (ref 5–17)
BUN SERPL-MCNC: 18 MG/DL — SIGNIFICANT CHANGE UP (ref 8–20)
CALCIUM SERPL-MCNC: 8.6 MG/DL — SIGNIFICANT CHANGE UP (ref 8.6–10.2)
CHLORIDE SERPL-SCNC: 101 MMOL/L — SIGNIFICANT CHANGE UP (ref 98–107)
CO2 SERPL-SCNC: 24 MMOL/L — SIGNIFICANT CHANGE UP (ref 22–29)
CREAT SERPL-MCNC: 0.9 MG/DL — SIGNIFICANT CHANGE UP (ref 0.5–1.3)
GLUCOSE SERPL-MCNC: 162 MG/DL — HIGH (ref 70–115)
HCT VFR BLD CALC: 35.1 % — LOW (ref 37–47)
HGB BLD-MCNC: 11.9 G/DL — LOW (ref 12–16)
MCHC RBC-ENTMCNC: 28.7 PG — SIGNIFICANT CHANGE UP (ref 27–31)
MCHC RBC-ENTMCNC: 33.9 G/DL — SIGNIFICANT CHANGE UP (ref 32–36)
MCV RBC AUTO: 84.8 FL — SIGNIFICANT CHANGE UP (ref 81–99)
PLATELET # BLD AUTO: 250 K/UL — SIGNIFICANT CHANGE UP (ref 150–400)
POTASSIUM SERPL-MCNC: 4.3 MMOL/L — SIGNIFICANT CHANGE UP (ref 3.5–5.3)
POTASSIUM SERPL-SCNC: 4.3 MMOL/L — SIGNIFICANT CHANGE UP (ref 3.5–5.3)
RBC # BLD: 4.14 M/UL — LOW (ref 4.4–5.2)
RBC # FLD: 13.6 % — SIGNIFICANT CHANGE UP (ref 11–15.6)
SODIUM SERPL-SCNC: 139 MMOL/L — SIGNIFICANT CHANGE UP (ref 135–145)
WBC # BLD: 15.7 K/UL — HIGH (ref 4.8–10.8)
WBC # FLD AUTO: 15.7 K/UL — HIGH (ref 4.8–10.8)

## 2018-02-13 PROCEDURE — 99232 SBSQ HOSP IP/OBS MODERATE 35: CPT

## 2018-02-13 RX ORDER — OFLOXACIN 0.3 %
2 DROPS OPHTHALMIC (EYE)
Qty: 0 | Refills: 0 | Status: DISCONTINUED | OUTPATIENT
Start: 2018-02-13 | End: 2018-02-14

## 2018-02-13 RX ORDER — PREDNISOLONE SODIUM PHOSPHATE 1 %
2 DROPS OPHTHALMIC (EYE)
Qty: 0 | Refills: 0 | Status: DISCONTINUED | OUTPATIENT
Start: 2018-02-13 | End: 2018-02-14

## 2018-02-13 RX ADMIN — Medication 975 MILLIGRAM(S): at 23:37

## 2018-02-13 RX ADMIN — Medication 975 MILLIGRAM(S): at 05:35

## 2018-02-13 RX ADMIN — AMLODIPINE BESYLATE 5 MILLIGRAM(S): 2.5 TABLET ORAL at 05:36

## 2018-02-13 RX ADMIN — Medication 1 TABLET(S): at 13:14

## 2018-02-13 RX ADMIN — Medication 1 MILLIGRAM(S): at 13:16

## 2018-02-13 RX ADMIN — OXYCODONE HYDROCHLORIDE 10 MILLIGRAM(S): 5 TABLET ORAL at 23:40

## 2018-02-13 RX ADMIN — Medication 100 MILLIGRAM(S): at 05:35

## 2018-02-13 RX ADMIN — Medication 100 MILLIGRAM(S): at 13:14

## 2018-02-13 RX ADMIN — SODIUM CHLORIDE 100 MILLILITER(S): 9 INJECTION, SOLUTION INTRAVENOUS at 08:21

## 2018-02-13 RX ADMIN — OXYCODONE HYDROCHLORIDE 10 MILLIGRAM(S): 5 TABLET ORAL at 06:35

## 2018-02-13 RX ADMIN — Medication 975 MILLIGRAM(S): at 06:35

## 2018-02-13 RX ADMIN — OXYCODONE HYDROCHLORIDE 10 MILLIGRAM(S): 5 TABLET ORAL at 05:36

## 2018-02-13 RX ADMIN — Medication 2 DROP(S): at 16:42

## 2018-02-13 RX ADMIN — Medication 325 MILLIGRAM(S): at 16:42

## 2018-02-13 RX ADMIN — Medication 166.67 MILLIGRAM(S): at 00:15

## 2018-02-13 RX ADMIN — OXYCODONE HYDROCHLORIDE 10 MILLIGRAM(S): 5 TABLET ORAL at 16:42

## 2018-02-13 RX ADMIN — Medication 1000 MILLIGRAM(S): at 00:46

## 2018-02-13 RX ADMIN — Medication 500 MILLIGRAM(S): at 16:42

## 2018-02-13 RX ADMIN — Medication 100 MILLIGRAM(S): at 23:41

## 2018-02-13 RX ADMIN — Medication 100 MILLIGRAM(S): at 02:11

## 2018-02-13 RX ADMIN — Medication 500 MILLIGRAM(S): at 05:36

## 2018-02-13 RX ADMIN — VALSARTAN 160 MILLIGRAM(S): 80 TABLET ORAL at 05:36

## 2018-02-13 RX ADMIN — POLYETHYLENE GLYCOL 3350 17 GRAM(S): 17 POWDER, FOR SOLUTION ORAL at 13:14

## 2018-02-13 RX ADMIN — Medication 975 MILLIGRAM(S): at 14:12

## 2018-02-13 RX ADMIN — Medication 325 MILLIGRAM(S): at 13:14

## 2018-02-13 RX ADMIN — Medication 975 MILLIGRAM(S): at 13:14

## 2018-02-13 RX ADMIN — OXYCODONE HYDROCHLORIDE 10 MILLIGRAM(S): 5 TABLET ORAL at 17:20

## 2018-02-13 RX ADMIN — Medication 325 MILLIGRAM(S): at 05:36

## 2018-02-13 NOTE — OCCUPATIONAL THERAPY INITIAL EVALUATION ADULT - ADDITIONAL COMMENTS
Pt lives in house with 4 steps to enter and 3 steps inside up to patient's bedroom/bathroom. Bathroom has tub with curtains. Pt does not own any DME. Pt is right handed.

## 2018-02-13 NOTE — PROGRESS NOTE ADULT - ASSESSMENT
71 yo female with left knee pain for many years, now S/P L TKA , POD # 1 .       Problem/Recommendation - 1:  Problem: Primary osteoarthritis of left knee. Recommendation: S/P L TKA .     Problem/Recommendation - 2:  ·  Problem: Status post left knee replacement.  Recommendation: PT/OT/pain mgmt  DVT prophylaxis- as per ortho  Abx as per SCIP  Incentive spirometry  Prophylaxis of opioid  induced constipation.      Problem/Recommendation - 3:  ·  Problem: Essential hypertension.  Recommendation: continue home medications with parameters.      Problem/Recommendation - 4:  ·  Problem: Cataract of right eye, unspecified cataract type.  Recommendation: s/p resent extraction   Cataract of left eye , unspecified type - needs extraction in near  future   continue home meds with parameters ( family asked to bring meds to confirm frequency and dose ).     Problem/Recommendation - 5:  ·  Problem: Prophylactic measure.  Recommendation: DVT - prophylaxis - ASA BID as per ortho team.     Problem / Plan -6: Leucocytosis - no S/S of infection - likely reactive , follow CBC in am     Problem / Plan -7: Anemia - likely ABLA  as a cause of postoperative anemia - asymptomatic , follow CBC in am . 73 yo female with left knee pain for many years, now S/P L TKA , POD # 1 .       Problem/Recommendation - 1:  Problem: Primary osteoarthritis of left knee. Recommendation: S/P L TKA .     Problem/Recommendation - 2:  ·  Problem: Status post left knee replacement.  Recommendation: PT/OT/pain mgmt  DVT prophylaxis- as per ortho  Abx as per SCIP  Incentive spirometry  Prophylaxis of opioid  induced constipation.      Problem/Recommendation - 3:  ·  Problem: Essential hypertension.  Recommendation: continue home medications with parameters. Continue ivf till 3 pm and d/c if BP stable      Problem/Recommendation - 4:  ·  Problem: Cataract of right eye, unspecified cataract type.  Recommendation: s/p resent extraction   Cataract of left eye , unspecified type - needs extraction in near  future   continue home meds .     Problem/Recommendation - 5:  ·  Problem: Prophylactic measure.  Recommendation: DVT - prophylaxis - ASA BID as per ortho team.     Problem / Plan -6: Leucocytosis - no S/S of infection - likely reactive , follow CBC in am     Problem / Plan -7: Anemia - likely ABLA  as a cause of postoperative anemia - asymptomatic , follow CBC in am .

## 2018-02-13 NOTE — PROGRESS NOTE ADULT - SUBJECTIVE AND OBJECTIVE BOX
Patient seen and examined . S/p  L TKA , POD # 1    CC : L knee pain           PAST MEDICAL & SURGICAL HISTORY:  Risk factors for obstructive sleep apnea  OA (osteoarthritis)  Hypertension  b/l cataracts  S/P cataract extraction, right      MEDICATIONS  (STANDING):  acetaminophen   Tablet. 975 milliGRAM(s) Oral every 8 hours  amLODIPine   Tablet 5 milliGRAM(s) Oral daily  ascorbic acid 500 milliGRAM(s) Oral two times a day  aspirin enteric coated 325 milliGRAM(s) Oral two times a day  docusate sodium 100 milliGRAM(s) Oral three times a day  ferrous    sulfate 325 milliGRAM(s) Oral daily  folic acid 1 milliGRAM(s) Oral daily  lactated ringers. 1000 milliLiter(s) (100 mL/Hr) IV Continuous <Continuous>  multivitamin 1 Tablet(s) Oral daily  ofloxacin 0.3% Solution 1 Drop(s) Both Ears daily  oxyCODONE  ER Tablet 10 milliGRAM(s) Oral every 12 hours  polyethylene glycol 3350 17 Gram(s) Oral daily  valsartan 160 milliGRAM(s) Oral daily    MEDICATIONS  (PRN):  aluminum hydroxide/magnesium hydroxide/simethicone Suspension 30 milliLiter(s) Oral four times a day PRN Indigestion  HYDROmorphone  Injectable 0.5 milliGRAM(s) IV Push every 3 hours PRN breakthrough pain  magnesium hydroxide Suspension 30 milliLiter(s) Oral daily PRN Constipation  ondansetron Injectable 4 milliGRAM(s) IV Push every 6 hours PRN Nausea and/or Vomiting  oxyCODONE    IR 5 milliGRAM(s) Oral every 3 hours PRN Moderate Pain (4 - 6)  oxyCODONE    IR 10 milliGRAM(s) Oral every 3 hours PRN Severe Pain (7 - 10)  senna 2 Tablet(s) Oral at bedtime PRN Constipation      LABS:                          11.9   15.7  )-----------( 250      ( 13 Feb 2018 05:54 )             35.1     02-13    139  |  101  |  18.0  ----------------------------<  162<H>  4.3   |  24.0  |  0.90    Ca    8.6      13 Feb 2018 05:54            REVIEW OF SYSTEMS:    CONSTITUTIONAL: No fever, weight loss, or fatigue  EYES: No eye pain, visual disturbances, or discharge  ENMT:  No difficulty hearing, tinnitus, vertigo; No sinus or throat pain  NECK: No pain or stiffness  RESPIRATORY: No cough, wheezing, chills or hemoptysis; No shortness of breath  CARDIOVASCULAR: No chest pain, palpitations, dizziness, or leg swelling  GASTROINTESTINAL: No abdominal or epigastric pain. No nausea, vomiting, or hematemesis; No diarrhea or constipation. No melena or hematochezia.  GENITOURINARY: No dysuria, frequency, hematuria, or incontinence  NEUROLOGICAL: No headaches, memory loss, loss of strength, numbness, or tremors  SKIN: No itching, burning, rashes, or lesions   LYMPH NODES: No enlarged glands  ENDOCRINE: No heat or cold intolerance; No hair loss  MUSCULOSKELETAL: L knee pain   PSYCHIATRIC: No depression, anxiety, mood swings, or difficulty sleeping  HEME/LYMPH: No easy bruising, or bleeding gums  ALLERGY AND IMMUNOLOGIC: No hives or eczema    Vital Signs Last 24 Hrs  T(C): 36.6 (13 Feb 2018 08:20), Max: 37 (12 Feb 2018 16:53)  T(F): 97.9 (13 Feb 2018 08:20), Max: 98.6 (12 Feb 2018 16:53)  HR: 75 (13 Feb 2018 08:20) (74 - 91)  BP: 98/67 (13 Feb 2018 08:20) (85/42 - 165/88)  BP(mean): --  RR: 19 (13 Feb 2018 08:20) (16 - 21)  SpO2: 96% (13 Feb 2018 08:20) (88% - 100%)  PHYSICAL EXAM:    GENERAL: NAD, well-groomed, well-developed  HEAD:  Atraumatic, Normocephalic  EYES: EOMI, PERRLA, conjunctiva and sclera clear  NECK: Supple, No JVD, Normal thyroid  NERVOUS SYSTEM:  Alert & Oriented X3, no focal deficit  CHEST/LUNG: CTA b/l ,  no  rales, rhonchi, wheezing, or rubs  HEART: Regular rate and rhythm; No murmurs, rubs, or gallops  ABDOMEN: Soft, Nontender, Nondistended; Bowel sounds present  EXTREMITIES:  2+ Peripheral Pulses, No clubbing, cyanosis, or edema , L knee dressing + , clean and dry   LYMPH: No lymphadenopathy noted  SKIN: No rashes or lesions Patient seen and examined . S/p  L TKA , POD # 1. Doing well . Latvian speaking , son  Brenden at bed side helped with translation .    CC : L knee pain well controlled , no other complaints           PAST MEDICAL & SURGICAL HISTORY:  Risk factors for obstructive sleep apnea  OA (osteoarthritis)  Hypertension  b/l cataracts  S/P cataract extraction, right      MEDICATIONS  (STANDING):  acetaminophen   Tablet. 975 milliGRAM(s) Oral every 8 hours  amLODIPine   Tablet 5 milliGRAM(s) Oral daily  ascorbic acid 500 milliGRAM(s) Oral two times a day  aspirin enteric coated 325 milliGRAM(s) Oral two times a day  docusate sodium 100 milliGRAM(s) Oral three times a day  ferrous    sulfate 325 milliGRAM(s) Oral daily  folic acid 1 milliGRAM(s) Oral daily  lactated ringers. 1000 milliLiter(s) (100 mL/Hr) IV Continuous <Continuous>  multivitamin 1 Tablet(s) Oral daily  ofloxacin 0.3% Solution 1 Drop(s) Both Ears daily  oxyCODONE  ER Tablet 10 milliGRAM(s) Oral every 12 hours  polyethylene glycol 3350 17 Gram(s) Oral daily  valsartan 160 milliGRAM(s) Oral daily    MEDICATIONS  (PRN):  aluminum hydroxide/magnesium hydroxide/simethicone Suspension 30 milliLiter(s) Oral four times a day PRN Indigestion  HYDROmorphone  Injectable 0.5 milliGRAM(s) IV Push every 3 hours PRN breakthrough pain  magnesium hydroxide Suspension 30 milliLiter(s) Oral daily PRN Constipation  ondansetron Injectable 4 milliGRAM(s) IV Push every 6 hours PRN Nausea and/or Vomiting  oxyCODONE    IR 5 milliGRAM(s) Oral every 3 hours PRN Moderate Pain (4 - 6)  oxyCODONE    IR 10 milliGRAM(s) Oral every 3 hours PRN Severe Pain (7 - 10)  senna 2 Tablet(s) Oral at bedtime PRN Constipation      LABS:                          11.9   15.7  )-----------( 250      ( 13 Feb 2018 05:54 )             35.1     02-13    139  |  101  |  18.0  ----------------------------<  162<H>  4.3   |  24.0  |  0.90    Ca    8.6      13 Feb 2018 05:54            REVIEW OF SYSTEMS:    CONSTITUTIONAL: No fever, weight loss, or fatigue  EYES: No eye pain, visual disturbances, or discharge  ENMT:  No difficulty hearing, tinnitus, vertigo; No sinus or throat pain  NECK: No pain or stiffness  RESPIRATORY: No cough, wheezing, chills or hemoptysis; No shortness of breath  CARDIOVASCULAR: No chest pain, palpitations, dizziness, or leg swelling  GASTROINTESTINAL: No abdominal or epigastric pain. No nausea, vomiting, or hematemesis; No diarrhea or constipation. No melena or hematochezia.  GENITOURINARY: No dysuria, frequency, hematuria, or incontinence  NEUROLOGICAL: No headaches, memory loss, loss of strength, numbness, or tremors  SKIN: No itching, burning, rashes, or lesions   LYMPH NODES: No enlarged glands  ENDOCRINE: No heat or cold intolerance; No hair loss  MUSCULOSKELETAL: L knee pain well controlled   PSYCHIATRIC: No depression, anxiety, mood swings, or difficulty sleeping  HEME/LYMPH: No easy bruising, or bleeding gums  ALLERGY AND IMMUNOLOGIC: No hives or eczema    Vital Signs Last 24 Hrs  T(C): 36.6 (13 Feb 2018 08:20), Max: 37 (12 Feb 2018 16:53)  T(F): 97.9 (13 Feb 2018 08:20), Max: 98.6 (12 Feb 2018 16:53)  HR: 75 (13 Feb 2018 08:20) (74 - 91)  BP: 98/67 (13 Feb 2018 08:20) (85/42 - 165/88)  BP(mean): --  RR: 19 (13 Feb 2018 08:20) (16 - 21)  SpO2: 96% (13 Feb 2018 08:20) (88% - 100%)  PHYSICAL EXAM:    GENERAL: NAD, well-groomed, well-developed  HEAD:  Atraumatic, Normocephalic  EYES: EOMI, PERRLA, conjunctiva and sclera clear  NECK: Supple, No JVD, Normal thyroid  NERVOUS SYSTEM:  Alert & Oriented X3, no focal deficit  CHEST/LUNG: CTA b/l ,  no  rales, rhonchi, wheezing, or rubs  HEART: Regular rate and rhythm; No murmurs, rubs, or gallops  ABDOMEN: Soft, Nontender, Nondistended; Bowel sounds present  EXTREMITIES:  2+ Peripheral Pulses, No clubbing, cyanosis, or edema , L knee dressing + , clean and dry   LYMPH: No lymphadenopathy noted  SKIN: No rashes or lesions

## 2018-02-13 NOTE — PROGRESS NOTE ADULT - SUBJECTIVE AND OBJECTIVE BOX
Patient seen and examined at bedside with . Comfortable in bed, pain controlled. Denies fever/chills, SOB/chest pain, abdominal pain, numbness/tingling. no complaints    Vital Signs Last 24 Hrs  T(C): 36.6 (13 Feb 2018 04:18), Max: 37 (12 Feb 2018 16:53)  T(F): 97.8 (13 Feb 2018 04:18), Max: 98.6 (12 Feb 2018 16:53)  HR: 74 (13 Feb 2018 04:18) (74 - 91)  BP: 114/66 (13 Feb 2018 04:18) (85/42 - 165/88)  BP(mean): --  RR: 18 (13 Feb 2018 04:18) (16 - 21)  SpO2: 97% (13 Feb 2018 04:18) (88% - 100%)    LLE: Dressing C/D/I. SILT. + dorsi/plantarflexion. DP 2+. Calf soft NT B/L.                          11.9   15.7  )-----------( 250      ( 13 Feb 2018 05:54 )             35.1   02-13    139  |  101  |  18.0  ----------------------------<  162<H>  4.3   |  24.0  |  0.90    Ca    8.6      13 Feb 2018 05:54    A/P: 72 y.o F s/p left TKA POD #1  - WBAT  - DVTP  - d/c planning - home likely tomorrow if cleared by PT/medicine

## 2018-02-13 NOTE — PROGRESS NOTE ADULT - SUBJECTIVE AND OBJECTIVE BOX
72y Female po day 1  L TKA     T(C): 36.9 (02-13-18 @ 16:29), Max: 37 (02-12-18 @ 16:53)  HR: 70 (02-13-18 @ 16:29) (70 - 91)  BP: 106/64 (02-13-18 @ 16:29) (98/67 - 122/75)  RR: 19 (02-13-18 @ 16:29) (18 - 20)  SpO2: 93% (02-13-18 @ 16:29) (88% - 97%)    Pt seen, doing well, no anesthesia complications or complaints noted or reported.   No Nausea  Pain well controlled

## 2018-02-14 VITALS
DIASTOLIC BLOOD PRESSURE: 67 MMHG | SYSTOLIC BLOOD PRESSURE: 121 MMHG | OXYGEN SATURATION: 92 % | TEMPERATURE: 98 F | RESPIRATION RATE: 19 BRPM | HEART RATE: 70 BPM

## 2018-02-14 LAB
HCT VFR BLD CALC: 32.1 % — LOW (ref 37–47)
HGB BLD-MCNC: 10.5 G/DL — LOW (ref 12–16)
MCHC RBC-ENTMCNC: 28.8 PG — SIGNIFICANT CHANGE UP (ref 27–31)
MCHC RBC-ENTMCNC: 32.7 G/DL — SIGNIFICANT CHANGE UP (ref 32–36)
MCV RBC AUTO: 88.2 FL — SIGNIFICANT CHANGE UP (ref 81–99)
PLATELET # BLD AUTO: 220 K/UL — SIGNIFICANT CHANGE UP (ref 150–400)
RBC # BLD: 3.64 M/UL — LOW (ref 4.4–5.2)
RBC # FLD: 14.3 % — SIGNIFICANT CHANGE UP (ref 11–15.6)
WBC # BLD: 10.5 K/UL — SIGNIFICANT CHANGE UP (ref 4.8–10.8)
WBC # FLD AUTO: 10.5 K/UL — SIGNIFICANT CHANGE UP (ref 4.8–10.8)

## 2018-02-14 PROCEDURE — 97163 PT EVAL HIGH COMPLEX 45 MIN: CPT

## 2018-02-14 PROCEDURE — 73560 X-RAY EXAM OF KNEE 1 OR 2: CPT

## 2018-02-14 PROCEDURE — C1713: CPT

## 2018-02-14 PROCEDURE — 97530 THERAPEUTIC ACTIVITIES: CPT

## 2018-02-14 PROCEDURE — C1776: CPT

## 2018-02-14 PROCEDURE — 36415 COLL VENOUS BLD VENIPUNCTURE: CPT

## 2018-02-14 PROCEDURE — 85027 COMPLETE CBC AUTOMATED: CPT

## 2018-02-14 PROCEDURE — 80048 BASIC METABOLIC PNL TOTAL CA: CPT

## 2018-02-14 PROCEDURE — 99232 SBSQ HOSP IP/OBS MODERATE 35: CPT

## 2018-02-14 PROCEDURE — 97167 OT EVAL HIGH COMPLEX 60 MIN: CPT

## 2018-02-14 PROCEDURE — 97535 SELF CARE MNGMENT TRAINING: CPT

## 2018-02-14 PROCEDURE — T1013: CPT

## 2018-02-14 PROCEDURE — 88311 DECALCIFY TISSUE: CPT

## 2018-02-14 PROCEDURE — 88305 TISSUE EXAM BY PATHOLOGIST: CPT

## 2018-02-14 RX ORDER — OXYCODONE HYDROCHLORIDE 5 MG/1
1 TABLET ORAL
Qty: 42 | Refills: 0 | OUTPATIENT
Start: 2018-02-14 | End: 2018-02-20

## 2018-02-14 RX ORDER — SENNA PLUS 8.6 MG/1
2 TABLET ORAL
Qty: 28 | Refills: 0 | OUTPATIENT
Start: 2018-02-14 | End: 2018-02-27

## 2018-02-14 RX ORDER — ASPIRIN/CALCIUM CARB/MAGNESIUM 324 MG
1 TABLET ORAL
Qty: 84 | Refills: 0 | OUTPATIENT
Start: 2018-02-14 | End: 2018-03-27

## 2018-02-14 RX ADMIN — Medication 2 DROP(S): at 05:55

## 2018-02-14 RX ADMIN — Medication 2 DROP(S): at 05:56

## 2018-02-14 RX ADMIN — Medication 975 MILLIGRAM(S): at 05:49

## 2018-02-14 RX ADMIN — Medication 1 MILLIGRAM(S): at 12:53

## 2018-02-14 RX ADMIN — CELECOXIB 200 MILLIGRAM(S): 200 CAPSULE ORAL at 09:17

## 2018-02-14 RX ADMIN — Medication 325 MILLIGRAM(S): at 12:53

## 2018-02-14 RX ADMIN — Medication 500 MILLIGRAM(S): at 05:51

## 2018-02-14 RX ADMIN — OXYCODONE HYDROCHLORIDE 10 MILLIGRAM(S): 5 TABLET ORAL at 05:57

## 2018-02-14 RX ADMIN — OXYCODONE HYDROCHLORIDE 10 MILLIGRAM(S): 5 TABLET ORAL at 05:49

## 2018-02-14 RX ADMIN — Medication 1 TABLET(S): at 12:53

## 2018-02-14 RX ADMIN — Medication 100 MILLIGRAM(S): at 05:55

## 2018-02-14 RX ADMIN — Medication 975 MILLIGRAM(S): at 05:57

## 2018-02-14 RX ADMIN — POLYETHYLENE GLYCOL 3350 17 GRAM(S): 17 POWDER, FOR SOLUTION ORAL at 12:53

## 2018-02-14 RX ADMIN — Medication 325 MILLIGRAM(S): at 05:51

## 2018-02-14 RX ADMIN — OXYCODONE HYDROCHLORIDE 10 MILLIGRAM(S): 5 TABLET ORAL at 05:55

## 2018-02-14 RX ADMIN — OXYCODONE HYDROCHLORIDE 10 MILLIGRAM(S): 5 TABLET ORAL at 05:58

## 2018-02-14 NOTE — PROGRESS NOTE ADULT - SUBJECTIVE AND OBJECTIVE BOX
Patient seen and examined . S/p L TKA  , POD # 2    CC : Left knee pain       PAST MEDICAL & SURGICAL HISTORY:  Risk factors for obstructive sleep apnea  OA (osteoarthritis)  Hypertension  S/P cataract extraction, right      MEDICATIONS  (STANDING):  acetaminophen   Tablet. 975 milliGRAM(s) Oral every 8 hours  amLODIPine   Tablet 5 milliGRAM(s) Oral daily  ascorbic acid 500 milliGRAM(s) Oral two times a day  aspirin enteric coated 325 milliGRAM(s) Oral two times a day  Bromfenac ophth Soln 0.09% 1 Drop(s) 1 Drop(s) Both EYES daily  celecoxib 200 milliGRAM(s) Oral two times a day with meals  docusate sodium 100 milliGRAM(s) Oral three times a day  ferrous    sulfate 325 milliGRAM(s) Oral daily  folic acid 1 milliGRAM(s) Oral daily  lactated ringers. 1000 milliLiter(s) (100 mL/Hr) IV Continuous <Continuous>  multivitamin 1 Tablet(s) Oral daily  ofloxacin 0.3% Solution 2 Drop(s) Left EYE two times a day  oxyCODONE  ER Tablet 10 milliGRAM(s) Oral every 12 hours  polyethylene glycol 3350 17 Gram(s) Oral daily  prednisoLONE acetate 1% Suspension 2 Drop(s) Left EYE two times a day  valsartan 160 milliGRAM(s) Oral daily    MEDICATIONS  (PRN):  aluminum hydroxide/magnesium hydroxide/simethicone Suspension 30 milliLiter(s) Oral four times a day PRN Indigestion  bisacodyl Suppository 10 milliGRAM(s) Rectal daily PRN If no bowel movement by postoperative day #2  HYDROmorphone  Injectable 0.5 milliGRAM(s) IV Push every 3 hours PRN breakthrough pain  magnesium hydroxide Suspension 30 milliLiter(s) Oral daily PRN Constipation  ondansetron Injectable 4 milliGRAM(s) IV Push every 6 hours PRN Nausea and/or Vomiting  oxyCODONE    IR 5 milliGRAM(s) Oral every 3 hours PRN Moderate Pain (4 - 6)  oxyCODONE    IR 10 milliGRAM(s) Oral every 3 hours PRN Severe Pain (7 - 10)  senna 2 Tablet(s) Oral at bedtime PRN Constipation      LABS:                          10.5   10.5  )-----------( 220      ( 14 Feb 2018 07:28 )             32.1     02-13    139  |  101  |  18.0  ----------------------------<  162<H>  4.3   |  24.0  |  0.90    Ca    8.6      13 Feb 2018 05:54        REVIEW OF SYSTEMS:    CONSTITUTIONAL: No fever, weight loss, or fatigue  EYES: No eye pain, visual disturbances, or discharge  ENMT:  No difficulty hearing, tinnitus, vertigo; No sinus or throat pain  NECK: No pain or stiffness  RESPIRATORY: No cough, wheezing, chills or hemoptysis; No shortness of breath  CARDIOVASCULAR: No chest pain, palpitations, dizziness, or leg swelling  GASTROINTESTINAL: No abdominal or epigastric pain. No nausea, vomiting, or hematemesis; No diarrhea or constipation. No melena or hematochezia.  GENITOURINARY: No dysuria, frequency, hematuria, or incontinence  NEUROLOGICAL: No headaches, memory loss, loss of strength, numbness, or tremors  SKIN: No itching, burning, rashes, or lesions   LYMPH NODES: No enlarged glands  ENDOCRINE: No heat or cold intolerance; No hair loss  MUSCULOSKELETAL: L knee pain well controlled   PSYCHIATRIC: No depression, anxiety, mood swings, or difficulty sleeping  HEME/LYMPH: No easy bruising, or bleeding gums  ALLERGY AND IMMUNOLOGIC: No hives or eczema    Vital Signs Last 24 Hrs  T(C): 36.9 (14 Feb 2018 08:26), Max: 37 (13 Feb 2018 19:12)  T(F): 98.5 (14 Feb 2018 08:26), Max: 98.6 (13 Feb 2018 19:12)  HR: 70 (14 Feb 2018 08:26) (68 - 77)  BP: 121/67 (14 Feb 2018 08:26) (100/59 - 122/66)  BP(mean): --  RR: 19 (14 Feb 2018 08:26) (18 - 19)  SpO2: 92% (14 Feb 2018 08:26) (92% - 95%)  PHYSICAL EXAM:    GENERAL: NAD, well-groomed, well-developed  HEAD:  Atraumatic, Normocephalic  EYES: EOMI, PERRLA, conjunctiva and sclera clear  NECK: Supple, No JVD, Normal thyroid  NERVOUS SYSTEM:  Alert & Oriented X3, no focal deficit  CHEST/LUNG: CTA b/l ,  no  rales, rhonchi, wheezing, or rubs  HEART: Regular rate and rhythm; No murmurs, rubs, or gallops  ABDOMEN: Soft, Nontender, Nondistended; Bowel sounds present  EXTREMITIES:  2+ Peripheral Pulses, No clubbing, cyanosis, or edema , L knee dressing + , clean and dry   LYMPH: No lymphadenopathy noted  SKIN: No rashes or lesions Patient seen and examined . S/p L TKA  , POD # 2 . Doing well , no complaints     CC : Left knee pain well controlled       PAST MEDICAL & SURGICAL HISTORY:  Risk factors for obstructive sleep apnea  OA (osteoarthritis)  Hypertension  S/P cataract extraction, right      MEDICATIONS  (STANDING):  acetaminophen   Tablet. 975 milliGRAM(s) Oral every 8 hours  amLODIPine   Tablet 5 milliGRAM(s) Oral daily  ascorbic acid 500 milliGRAM(s) Oral two times a day  aspirin enteric coated 325 milliGRAM(s) Oral two times a day  Bromfenac ophth Soln 0.09% 1 Drop(s) 1 Drop(s) Both EYES daily  celecoxib 200 milliGRAM(s) Oral two times a day with meals  docusate sodium 100 milliGRAM(s) Oral three times a day  ferrous    sulfate 325 milliGRAM(s) Oral daily  folic acid 1 milliGRAM(s) Oral daily  lactated ringers. 1000 milliLiter(s) (100 mL/Hr) IV Continuous <Continuous>  multivitamin 1 Tablet(s) Oral daily  ofloxacin 0.3% Solution 2 Drop(s) Left EYE two times a day  oxyCODONE  ER Tablet 10 milliGRAM(s) Oral every 12 hours  polyethylene glycol 3350 17 Gram(s) Oral daily  prednisoLONE acetate 1% Suspension 2 Drop(s) Left EYE two times a day  valsartan 160 milliGRAM(s) Oral daily    MEDICATIONS  (PRN):  aluminum hydroxide/magnesium hydroxide/simethicone Suspension 30 milliLiter(s) Oral four times a day PRN Indigestion  bisacodyl Suppository 10 milliGRAM(s) Rectal daily PRN If no bowel movement by postoperative day #2  HYDROmorphone  Injectable 0.5 milliGRAM(s) IV Push every 3 hours PRN breakthrough pain  magnesium hydroxide Suspension 30 milliLiter(s) Oral daily PRN Constipation  ondansetron Injectable 4 milliGRAM(s) IV Push every 6 hours PRN Nausea and/or Vomiting  oxyCODONE    IR 5 milliGRAM(s) Oral every 3 hours PRN Moderate Pain (4 - 6)  oxyCODONE    IR 10 milliGRAM(s) Oral every 3 hours PRN Severe Pain (7 - 10)  senna 2 Tablet(s) Oral at bedtime PRN Constipation      LABS:                          10.5   10.5  )-----------( 220      ( 14 Feb 2018 07:28 )             32.1     02-13    139  |  101  |  18.0  ----------------------------<  162<H>  4.3   |  24.0  |  0.90    Ca    8.6      13 Feb 2018 05:54        REVIEW OF SYSTEMS:    CONSTITUTIONAL: No fever, weight loss, or fatigue  EYES: No eye pain, visual disturbances, or discharge  ENMT:  No difficulty hearing, tinnitus, vertigo; No sinus or throat pain  NECK: No pain or stiffness  RESPIRATORY: No cough, wheezing, chills or hemoptysis; No shortness of breath  CARDIOVASCULAR: No chest pain, palpitations, dizziness, or leg swelling  GASTROINTESTINAL: No abdominal or epigastric pain. No nausea, vomiting, or hematemesis; No diarrhea or constipation. No melena or hematochezia.  GENITOURINARY: No dysuria, frequency, hematuria, or incontinence  NEUROLOGICAL: No headaches, memory loss, loss of strength, numbness, or tremors  SKIN: No itching, burning, rashes, or lesions   LYMPH NODES: No enlarged glands  ENDOCRINE: No heat or cold intolerance; No hair loss  MUSCULOSKELETAL: L knee pain well controlled   PSYCHIATRIC: No depression, anxiety, mood swings, or difficulty sleeping  HEME/LYMPH: No easy bruising, or bleeding gums  ALLERGY AND IMMUNOLOGIC: No hives or eczema    Vital Signs Last 24 Hrs  T(C): 36.9 (14 Feb 2018 08:26), Max: 37 (13 Feb 2018 19:12)  T(F): 98.5 (14 Feb 2018 08:26), Max: 98.6 (13 Feb 2018 19:12)  HR: 70 (14 Feb 2018 08:26) (68 - 77)  BP: 121/67 (14 Feb 2018 08:26) (100/59 - 122/66)  BP(mean): --  RR: 19 (14 Feb 2018 08:26) (18 - 19)  SpO2: 92% (14 Feb 2018 08:26) (92% - 95%)  PHYSICAL EXAM:    GENERAL: NAD, well-groomed, well-developed  HEAD:  Atraumatic, Normocephalic  EYES: EOMI, PERRLA, conjunctiva and sclera clear  NECK: Supple, No JVD, Normal thyroid  NERVOUS SYSTEM:  Alert & Oriented X3, no focal deficit  CHEST/LUNG: CTA b/l ,  no  rales, rhonchi, wheezing, or rubs  HEART: Regular rate and rhythm; No murmurs, rubs, or gallops  ABDOMEN: Soft, Nontender, Nondistended; Bowel sounds present  EXTREMITIES:  2+ Peripheral Pulses, No clubbing, cyanosis, or edema , L knee dressing + , clean and dry   LYMPH: No lymphadenopathy noted  SKIN: No rashes or lesions

## 2018-02-14 NOTE — PROGRESS NOTE ADULT - SUBJECTIVE AND OBJECTIVE BOX
GARRET VALLE  [id number]    History: 72y Female is status post left total knee arthroplasty on 2/12, POD#2.   Patient is doing well and is comfortable. The patient's pain is controlled using the prescribed pain medications. The patient is participating in physical therapy, WBAT.   Denies nausea, vomiting, chest pain, shortness of breath, abdominal pain or fever. No new complaints.                        10.5   10.5  )-----------( 220      ( 14 Feb 2018 07:28 )             32.1     02-13    139  |  101  |  18.0  ----------------------------<  162<H>  4.3   |  24.0  |  0.90    Ca    8.6      13 Feb 2018 05:54        MEDICATIONS  (STANDING):  acetaminophen   Tablet. 975 milliGRAM(s) Oral every 8 hours  amLODIPine   Tablet 5 milliGRAM(s) Oral daily  ascorbic acid 500 milliGRAM(s) Oral two times a day  aspirin enteric coated 325 milliGRAM(s) Oral two times a day  Bromfenac ophth Soln 0.09% 1 Drop(s) 1 Drop(s) Both EYES daily  celecoxib 200 milliGRAM(s) Oral two times a day with meals  docusate sodium 100 milliGRAM(s) Oral three times a day  ferrous    sulfate 325 milliGRAM(s) Oral daily  folic acid 1 milliGRAM(s) Oral daily  lactated ringers. 1000 milliLiter(s) (100 mL/Hr) IV Continuous <Continuous>  multivitamin 1 Tablet(s) Oral daily  ofloxacin 0.3% Solution 2 Drop(s) Left EYE two times a day  oxyCODONE  ER Tablet 10 milliGRAM(s) Oral every 12 hours  polyethylene glycol 3350 17 Gram(s) Oral daily  prednisoLONE acetate 1% Suspension 2 Drop(s) Left EYE two times a day  valsartan 160 milliGRAM(s) Oral daily    MEDICATIONS  (PRN):  aluminum hydroxide/magnesium hydroxide/simethicone Suspension 30 milliLiter(s) Oral four times a day PRN Indigestion  bisacodyl Suppository 10 milliGRAM(s) Rectal daily PRN If no bowel movement by postoperative day #2  HYDROmorphone  Injectable 0.5 milliGRAM(s) IV Push every 3 hours PRN breakthrough pain  magnesium hydroxide Suspension 30 milliLiter(s) Oral daily PRN Constipation  ondansetron Injectable 4 milliGRAM(s) IV Push every 6 hours PRN Nausea and/or Vomiting  oxyCODONE    IR 5 milliGRAM(s) Oral every 3 hours PRN Moderate Pain (4 - 6)  oxyCODONE    IR 10 milliGRAM(s) Oral every 3 hours PRN Severe Pain (7 - 10)  senna 2 Tablet(s) Oral at bedtime PRN Constipation      Physical exam: The left knee surgical  dressing remains clean, dry and intact  Dressings removed, and new dressings placed.  Incision is clean dry and intact.  No drainage or discharge. No erythema is noted. No blistering.  The calf is supple nontender. Passive range of motion is acceptable to due postoperative pain. No calf tenderness. Sensation to light touch is grossly intact distally. Motor function distally is 5/5. Extensor hallucis longus and flexor hallucis longus are intact. No foot drop. 2+ dorsalis pedis pulse. Capillary refill is less than 2 seconds. No cyanosis.    Primary Orthopedic Assessment:  •	s/p LEFT total knee replacement    Secondary  Orthopedic Assessment(s):   •	    Secondary  Medical Assessment(s):   •	    Plan:   •	DVT prophylaxis as prescribed, including use of compression devices and ankle pumps  •	Continue physical therapy  •	Weightbearing as tolerated of the Left lower extremity with assistance of a walker  •	Incentive spirometry encouraged  •	Pain control as clinically indicated  •	Discharge planning – anticipated discharge is Home

## 2018-02-14 NOTE — PROGRESS NOTE ADULT - ASSESSMENT
71 yo female with left knee pain for many years, now S/P L TKA , POD #  2 .       Problem/Recommendation - 1:  Problem: Primary osteoarthritis of left knee. Recommendation: S/P L TKA .     Problem/Recommendation - 2:  ·  Problem: Status post left knee replacement.  Recommendation: PT/OT/pain mgmt  DVT prophylaxis- as per ortho  Abx as per SCIP  Incentive spirometry  Prophylaxis of opioid  induced constipation.      Problem/Recommendation - 3:  ·  Problem: Essential hypertension.  Recommendation: continue home medications with parameters. Patient advised to increase PO fluid intake .     Problem/Recommendation - 4:  ·  Problem: Cataract of right eye, unspecified cataract type.  Recommendation: s/p resent extraction   Cataract of left eye , unspecified type - needs extraction in near  future   continue home meds .     Problem/Recommendation - 5:  ·  Problem: Prophylactic measure.  Recommendation: DVT - prophylaxis - ASA BID as per ortho team.    Problem / Plan -6: Leucocytosis - no S/S of infection - likely reactive , resolved      Problem / Plan -7: Anemia - likely ABLA  as a cause of postoperative anemia - asymptomatic .

## 2018-02-15 LAB — SURGICAL PATHOLOGY FINAL REPORT - CH: SIGNIFICANT CHANGE UP

## 2018-03-02 DIAGNOSIS — M25.562 PAIN IN RIGHT KNEE: ICD-10-CM

## 2018-03-02 DIAGNOSIS — M25.561 PAIN IN RIGHT KNEE: ICD-10-CM

## 2018-03-14 ENCOUNTER — APPOINTMENT (OUTPATIENT)
Dept: ORTHOPEDIC SURGERY | Facility: CLINIC | Age: 73
End: 2018-03-14
Payer: MEDICAID

## 2018-03-14 PROCEDURE — 99024 POSTOP FOLLOW-UP VISIT: CPT

## 2018-03-30 ENCOUNTER — OTHER (OUTPATIENT)
Age: 73
End: 2018-03-30

## 2018-04-11 ENCOUNTER — APPOINTMENT (OUTPATIENT)
Dept: ORTHOPEDIC SURGERY | Facility: CLINIC | Age: 73
End: 2018-04-11
Payer: MEDICAID

## 2018-04-11 PROCEDURE — 99024 POSTOP FOLLOW-UP VISIT: CPT

## 2018-04-11 PROCEDURE — 73562 X-RAY EXAM OF KNEE 3: CPT | Mod: LT

## 2018-09-05 ENCOUNTER — OTHER (OUTPATIENT)
Age: 73
End: 2018-09-05

## 2018-10-03 ENCOUNTER — APPOINTMENT (OUTPATIENT)
Dept: ORTHOPEDIC SURGERY | Facility: CLINIC | Age: 73
End: 2018-10-03

## 2018-11-13 PROBLEM — M19.90 UNSPECIFIED OSTEOARTHRITIS, UNSPECIFIED SITE: Chronic | Status: ACTIVE | Noted: 2018-01-29

## 2018-11-13 PROBLEM — I10 ESSENTIAL (PRIMARY) HYPERTENSION: Chronic | Status: ACTIVE | Noted: 2018-01-29

## 2018-11-13 PROBLEM — Z91.89 OTHER SPECIFIED PERSONAL RISK FACTORS, NOT ELSEWHERE CLASSIFIED: Chronic | Status: ACTIVE | Noted: 2018-01-29

## 2018-12-04 ENCOUNTER — APPOINTMENT (OUTPATIENT)
Dept: GASTROENTEROLOGY | Facility: CLINIC | Age: 73
End: 2018-12-04
Payer: MEDICARE

## 2018-12-04 VITALS
HEART RATE: 87 BPM | SYSTOLIC BLOOD PRESSURE: 173 MMHG | BODY MASS INDEX: 36.82 KG/M2 | OXYGEN SATURATION: 98 % | DIASTOLIC BLOOD PRESSURE: 95 MMHG | RESPIRATION RATE: 15 BRPM | HEIGHT: 61 IN | WEIGHT: 195 LBS

## 2018-12-04 DIAGNOSIS — Z78.9 OTHER SPECIFIED HEALTH STATUS: ICD-10-CM

## 2018-12-04 PROCEDURE — 99204 OFFICE O/P NEW MOD 45 MIN: CPT

## 2018-12-04 RX ORDER — OXYCODONE 5 MG/1
5 TABLET ORAL
Qty: 60 | Refills: 0 | Status: DISCONTINUED | COMMUNITY
Start: 2018-02-20 | End: 2018-12-04

## 2018-12-04 RX ORDER — OXYCODONE 5 MG/1
5 TABLET ORAL EVERY 6 HOURS
Qty: 30 | Refills: 0 | Status: DISCONTINUED | COMMUNITY
Start: 2018-03-23 | End: 2018-12-04

## 2018-12-05 ENCOUNTER — APPOINTMENT (OUTPATIENT)
Dept: ORTHOPEDIC SURGERY | Facility: CLINIC | Age: 73
End: 2018-12-05
Payer: MEDICARE

## 2018-12-05 DIAGNOSIS — Z96.652 PRESENCE OF LEFT ARTIFICIAL KNEE JOINT: ICD-10-CM

## 2018-12-05 PROCEDURE — 99214 OFFICE O/P EST MOD 30 MIN: CPT

## 2019-03-01 ENCOUNTER — OUTPATIENT (OUTPATIENT)
Dept: OUTPATIENT SERVICES | Facility: HOSPITAL | Age: 74
LOS: 1 days | End: 2019-03-01
Payer: MEDICARE

## 2019-03-01 DIAGNOSIS — Z98.41 CATARACT EXTRACTION STATUS, RIGHT EYE: Chronic | ICD-10-CM

## 2019-03-01 PROCEDURE — G9001: CPT

## 2019-03-04 ENCOUNTER — OUTPATIENT (OUTPATIENT)
Dept: OUTPATIENT SERVICES | Facility: HOSPITAL | Age: 74
LOS: 1 days | End: 2019-03-04
Payer: MEDICARE

## 2019-03-04 VITALS
HEIGHT: 61.5 IN | SYSTOLIC BLOOD PRESSURE: 169 MMHG | TEMPERATURE: 97 F | RESPIRATION RATE: 16 BRPM | WEIGHT: 187.39 LBS | HEART RATE: 87 BPM | DIASTOLIC BLOOD PRESSURE: 84 MMHG

## 2019-03-04 DIAGNOSIS — Z98.41 CATARACT EXTRACTION STATUS, RIGHT EYE: Chronic | ICD-10-CM

## 2019-03-04 DIAGNOSIS — Z96.652 PRESENCE OF LEFT ARTIFICIAL KNEE JOINT: Chronic | ICD-10-CM

## 2019-03-04 DIAGNOSIS — Z01.818 ENCOUNTER FOR OTHER PREPROCEDURAL EXAMINATION: ICD-10-CM

## 2019-03-04 DIAGNOSIS — M17.11 UNILATERAL PRIMARY OSTEOARTHRITIS, RIGHT KNEE: ICD-10-CM

## 2019-03-04 DIAGNOSIS — Z29.9 ENCOUNTER FOR PROPHYLACTIC MEASURES, UNSPECIFIED: ICD-10-CM

## 2019-03-04 DIAGNOSIS — I10 ESSENTIAL (PRIMARY) HYPERTENSION: ICD-10-CM

## 2019-03-04 LAB
ANION GAP SERPL CALC-SCNC: 14 MMOL/L — SIGNIFICANT CHANGE UP (ref 5–17)
APTT BLD: 29.6 SEC — SIGNIFICANT CHANGE UP (ref 27.5–36.3)
BASOPHILS # BLD AUTO: 0 K/UL — SIGNIFICANT CHANGE UP (ref 0–0.2)
BASOPHILS NFR BLD AUTO: 0.5 % — SIGNIFICANT CHANGE UP (ref 0–2)
BLD GP AB SCN SERPL QL: SIGNIFICANT CHANGE UP
BUN SERPL-MCNC: 19 MG/DL — SIGNIFICANT CHANGE UP (ref 8–20)
CALCIUM SERPL-MCNC: 9.4 MG/DL — SIGNIFICANT CHANGE UP (ref 8.6–10.2)
CHLORIDE SERPL-SCNC: 97 MMOL/L — LOW (ref 98–107)
CO2 SERPL-SCNC: 29 MMOL/L — SIGNIFICANT CHANGE UP (ref 22–29)
CREAT SERPL-MCNC: 0.79 MG/DL — SIGNIFICANT CHANGE UP (ref 0.5–1.3)
EOSINOPHIL # BLD AUTO: 0.3 K/UL — SIGNIFICANT CHANGE UP (ref 0–0.5)
EOSINOPHIL NFR BLD AUTO: 3.7 % — SIGNIFICANT CHANGE UP (ref 0–6)
GLUCOSE SERPL-MCNC: 136 MG/DL — HIGH (ref 70–115)
HCT VFR BLD CALC: 43.6 % — SIGNIFICANT CHANGE UP (ref 37–47)
HGB BLD-MCNC: 15.1 G/DL — SIGNIFICANT CHANGE UP (ref 12–16)
INR BLD: 0.97 RATIO — SIGNIFICANT CHANGE UP (ref 0.88–1.16)
LYMPHOCYTES # BLD AUTO: 1.9 K/UL — SIGNIFICANT CHANGE UP (ref 1–4.8)
LYMPHOCYTES # BLD AUTO: 24.4 % — SIGNIFICANT CHANGE UP (ref 20–55)
MCHC RBC-ENTMCNC: 29.4 PG — SIGNIFICANT CHANGE UP (ref 27–31)
MCHC RBC-ENTMCNC: 34.6 G/DL — SIGNIFICANT CHANGE UP (ref 32–36)
MCV RBC AUTO: 85 FL — SIGNIFICANT CHANGE UP (ref 81–99)
MONOCYTES # BLD AUTO: 0.4 K/UL — SIGNIFICANT CHANGE UP (ref 0–0.8)
MONOCYTES NFR BLD AUTO: 4.7 % — SIGNIFICANT CHANGE UP (ref 3–10)
MRSA PCR RESULT.: SIGNIFICANT CHANGE UP
NEUTROPHILS # BLD AUTO: 5.1 K/UL — SIGNIFICANT CHANGE UP (ref 1.8–8)
NEUTROPHILS NFR BLD AUTO: 66.6 % — SIGNIFICANT CHANGE UP (ref 37–73)
PLATELET # BLD AUTO: 295 K/UL — SIGNIFICANT CHANGE UP (ref 150–400)
POTASSIUM SERPL-MCNC: 3.6 MMOL/L — SIGNIFICANT CHANGE UP (ref 3.5–5.3)
POTASSIUM SERPL-SCNC: 3.6 MMOL/L — SIGNIFICANT CHANGE UP (ref 3.5–5.3)
PROTHROM AB SERPL-ACNC: 11.2 SEC — SIGNIFICANT CHANGE UP (ref 10–12.9)
RBC # BLD: 5.13 M/UL — SIGNIFICANT CHANGE UP (ref 4.4–5.2)
RBC # FLD: 14.1 % — SIGNIFICANT CHANGE UP (ref 11–15.6)
S AUREUS DNA NOSE QL NAA+PROBE: SIGNIFICANT CHANGE UP
SODIUM SERPL-SCNC: 140 MMOL/L — SIGNIFICANT CHANGE UP (ref 135–145)
TYPE + AB SCN PNL BLD: SIGNIFICANT CHANGE UP
WBC # BLD: 7.6 K/UL — SIGNIFICANT CHANGE UP (ref 4.8–10.8)
WBC # FLD AUTO: 7.6 K/UL — SIGNIFICANT CHANGE UP (ref 4.8–10.8)

## 2019-03-04 PROCEDURE — 85610 PROTHROMBIN TIME: CPT

## 2019-03-04 PROCEDURE — 85730 THROMBOPLASTIN TIME PARTIAL: CPT

## 2019-03-04 PROCEDURE — 86900 BLOOD TYPING SEROLOGIC ABO: CPT

## 2019-03-04 PROCEDURE — 36415 COLL VENOUS BLD VENIPUNCTURE: CPT

## 2019-03-04 PROCEDURE — 86850 RBC ANTIBODY SCREEN: CPT

## 2019-03-04 PROCEDURE — 87640 STAPH A DNA AMP PROBE: CPT

## 2019-03-04 PROCEDURE — T1013: CPT

## 2019-03-04 PROCEDURE — 93005 ELECTROCARDIOGRAM TRACING: CPT

## 2019-03-04 PROCEDURE — 86901 BLOOD TYPING SEROLOGIC RH(D): CPT

## 2019-03-04 PROCEDURE — 93010 ELECTROCARDIOGRAM REPORT: CPT

## 2019-03-04 PROCEDURE — 85027 COMPLETE CBC AUTOMATED: CPT

## 2019-03-04 PROCEDURE — 80048 BASIC METABOLIC PNL TOTAL CA: CPT

## 2019-03-04 RX ORDER — OFLOXACIN 0.3 %
0 DROPS OPHTHALMIC (EYE)
Qty: 5 | Refills: 0 | COMMUNITY

## 2019-03-04 RX ORDER — VALSARTAN 80 MG/1
1 TABLET ORAL
Qty: 0 | Refills: 0 | COMMUNITY

## 2019-03-04 RX ORDER — SODIUM CHLORIDE 9 MG/ML
3 INJECTION INTRAMUSCULAR; INTRAVENOUS; SUBCUTANEOUS EVERY 8 HOURS
Qty: 0 | Refills: 0 | Status: DISCONTINUED | OUTPATIENT
Start: 2019-03-18 | End: 2019-03-20

## 2019-03-04 RX ORDER — PREDNISOLONE SODIUM PHOSPHATE 1 %
0 DROPS OPHTHALMIC (EYE)
Qty: 5 | Refills: 0 | COMMUNITY

## 2019-03-04 NOTE — H&P PST ADULT - ASSESSMENT
medications reviewed, instructions given on what medications to take and what not to take. Asked the patient to take the Blood pressure medication/ heart medication on DOP.   CAPRINI VTE 2.0 SCORE [CLOT updated 2019]    AGE RELATED RISK FACTORS                                                       MOBILITY RELATED FACTORS  [ ] Age 41-60 years                                            (1 Point)                    [ ] Bed rest                                                        (1 Point)  [x ] Age: 61-74 years                                           (2 Points)                  [ ] Plaster cast                                                   (2 Points)  [ ] Age= 75 years                                              (3 Points)                    [ ] Bed bound for more than 72 hours                 (2 Points)    DISEASE RELATED RISK FACTORS                                               GENDER SPECIFIC FACTORS  [ ] Edema in the lower extremities                       (1 Point)              [ ] Pregnancy                                                     (1 Point)  [ ] Varicose veins                                               (1 Point)                     [ ] Post-partum < 6 weeks                                   (1 Point)             x[ ] BMI > 25 Kg/m2                                            (1 Point)                     [ ] Hormonal therapy  or oral contraception          (1 Point)                 [ ] Sepsis (in the previous month)                        (1 Point)               [ ] History of pregnancy complications                 (1 point)  [ ] Pneumonia or serious lung disease                                               [ ] Unexplained or recurrent                     (1 Point)           (in the previous month)                               (1 Point)  [ ] Abnormal pulmonary function test                     (1 Point)                 SURGERY RELATED RISK FACTORS  [ ] Acute myocardial infarction                              (1 Point)               [ ]  Section                                             (1 Point)  [ ] Congestive heart failure (in the previous month)  (1 Point)      [ ] Minor surgery                                                  (1 Point)   [ ] Inflammatory bowel disease                             (1 Point)               [ ] Arthroscopic surgery                                        (2 Points)  [ ] Central venous access                                      (2 Points)                [ ] General surgery lasting more than 45 minutes (2 points)  [ ] Malignancy- Present or previous                   (2 Points)                [ x] Elective arthroplasty                                         (5 points)    [ ] Stroke (in the previous month)                          (5 Points)                                                                                                                                                           HEMATOLOGY RELATED FACTORS                                                 TRAUMA RELATED RISK FACTORS  [ ] Prior episodes of VTE                                     (3 Points)                [ ] Fracture of the hip, pelvis, or leg                       (5 Points)  [ ] Positive family history for VTE                         (3 Points)             [ ] Acute spinal cord injury (in the previous month)  (5 Points)  [ ] Prothrombin 34672 A                                     (3 Points)               [ ] Paralysis  (less than 1 month)                             (5 Points)  [ ] Factor V Leiden                                             (3 Points)                  [ ] Multiple Trauma within 1 month                        (5 Points)  [ ] Lupus anticoagulants                                     (3 Points)                                                           [ ] Anticardiolipin antibodies                               (3 Points)                                                       [ ] High homocysteine in the blood                      (3 Points)                                             [ ] Other congenital or acquired thrombophilia      (3 Points)                                                [ ] Heparin induced thrombocytopenia                  (3 Points)                                     Total Score [      8    ]    OPIOID RISK TOOL    JESUS MANUEL EACH BOX THAT APPLIES AND ADD TOTALS AT THE END    FAMILY HISTORY OF SUBSTANCE ABUSE                 FEMALE         MALE                                                Alcohol                             [  ]1 pt          [  ]3pts                                               Illegal Drugs                     [  ]2 pts        [  ]3pts                                               Rx Drugs                           [  ]4 pts        [  ]4 pts    PERSONAL HISTORY OF SUBSTANCE ABUSE                                                                                          Alcohol                             [  ]3 pts       [  ]3 pts                                               Illegal Drugs                     [  ]4 pts        [  ]4 pts                                               Rx Drugs                           [  ]5 pts        [  ]5 pts    AGE BETWEEN 16-45 YEARS                                      [  ]1 pt         [  ]1 pt    HISTORY OF PREADOLESCENT   SEXUAL ABUSE                                                             [  ]3 pts        [  ]0pts    PSYCHOLOGICAL DISEASE                     ADD, OCD, Bipolar, Schizophrenia        [  ]2 pts         [  ]2 pts                      Depression                                               [  ]1 pt           [  ]1 pt           SCORING TOTAL   (add numbers and type here)              (0 )                                     A score of 3 or lower indicated LOW risk for future opioid abuse  A score of 4 to 7 indicated moderate risk for future opioid abuse  A score of 8 or higher indicates a high risk for opioid abuse

## 2019-03-04 NOTE — H&P PST ADULT - PROBLEM SELECTOR PLAN 3
Caprini Score 8 High risk,  Surgical team should assess /Strongly recommend pharmacological and mechanical measures for VTE prophylaxis

## 2019-03-04 NOTE — PATIENT PROFILE ADULT - NSPROEDALEARNPREF_GEN_A_NUR
verbal instruction/written material/individual instruction/Icelandic pre-op instructions, surgical wash, MRSA/MSSA & pain management reviewed pt verbalized understanding

## 2019-03-05 RX ORDER — BUPIVACAINE 13.3 MG/ML
20 INJECTION, SUSPENSION, LIPOSOMAL INFILTRATION ONCE
Qty: 0 | Refills: 0 | Status: DISCONTINUED | OUTPATIENT
Start: 2019-03-18 | End: 2019-03-20

## 2019-03-05 RX ORDER — CEFAZOLIN SODIUM 1 G
2000 VIAL (EA) INJECTION ONCE
Qty: 0 | Refills: 0 | Status: DISCONTINUED | OUTPATIENT
Start: 2019-03-18 | End: 2019-03-20

## 2019-03-05 RX ORDER — GABAPENTIN 400 MG/1
600 CAPSULE ORAL
Qty: 0 | Refills: 0 | Status: DISCONTINUED | OUTPATIENT
Start: 2019-03-18 | End: 2019-03-20

## 2019-03-05 RX ORDER — TRANEXAMIC ACID 100 MG/ML
850 INJECTION, SOLUTION INTRAVENOUS ONCE
Qty: 0 | Refills: 0 | Status: DISCONTINUED | OUTPATIENT
Start: 2019-03-18 | End: 2019-03-20

## 2019-03-05 RX ORDER — ACETAMINOPHEN 500 MG
1000 TABLET ORAL ONCE
Qty: 0 | Refills: 0 | Status: DISCONTINUED | OUTPATIENT
Start: 2019-03-18 | End: 2019-03-20

## 2019-03-06 ENCOUNTER — APPOINTMENT (OUTPATIENT)
Dept: ORTHOPEDIC SURGERY | Facility: CLINIC | Age: 74
End: 2019-03-06
Payer: MEDICARE

## 2019-03-06 DIAGNOSIS — Z47.1 AFTERCARE FOLLOWING JOINT REPLACEMENT SURGERY: ICD-10-CM

## 2019-03-06 DIAGNOSIS — M17.11 UNILATERAL PRIMARY OSTEOARTHRITIS, RIGHT KNEE: ICD-10-CM

## 2019-03-06 DIAGNOSIS — Z96.652 AFTERCARE FOLLOWING JOINT REPLACEMENT SURGERY: ICD-10-CM

## 2019-03-06 PROCEDURE — 73562 X-RAY EXAM OF KNEE 3: CPT | Mod: LT

## 2019-03-06 PROCEDURE — 99213 OFFICE O/P EST LOW 20 MIN: CPT

## 2019-03-06 NOTE — DISCUSSION/SUMMARY
[Medication Risks Reviewed] : Medication risks reviewed [Surgical risks reviewed] : Surgical risks reviewed [de-identified] : 73 year old female s/p left TKA DOS: 2/12/18, right knee DJD. She is one year out from surgery and satisfied with her postoperative result. Pt will continue home exercises and may continue to advance activity as tolerated. \par With regard to her right knee DJD, we talked about the nature of the condition and treatment options. She is a candidate for knee replacement given Xray findings and failure of conservative treatment, and is on the schedule for 3/18/19. FU after procedure. \par \par A knee replacement means a resurfacing of all three surfaces, the patella, femur, and tibia, with metal and plastic parts. The prosthetic parts are usually cemented into position and will allow a patient a range of motion from full extension to about 120 degrees of flexion. The postoperative motion, however, is determined by multiple factors the most important of which is preoperative motion. In general, the better the motion preoperatively, the better the motion post operatively.\par The operation, pending medical clearance, generally requires a hospitalization of 5 to 7 days for one knee (10 - 14 days for a bilateral replacement). In general, we prefer to perform the procedure under epidural anesthesia. Basically, we would like patients to give at least 2 units of blood per knee for an operative procedure, which takes approximately 2 hours. The operation requires a straight incision anywhere from 6 to 9 inches down the front of the knee. Post operatively the patient is positioned on a continuous passive motion machine within the first 24 hours, and walking the day after surgery. The first couple of days are very painful and the pain medication will alleviate but not eliminate the pain. Thus the patient must really push hard to get the range of motion. Our goal for having the person go home is that the range of motion is approximately 90 degrees, and they can walk with a cane. The cane is to be dispensed with once the patient is secure enough. In general, there is no cast or braces required with a routine knee replacement.\par In the long term, we do not encourage our patients to run for the sake of running; although, pending their preoperative status, we often allow patients to play doubles tennis or comparable activities. We also allow them to do gentle intermediate downhill skiing if they are truly an expert skier. Biking is encouraged as well as swimming. The follow up periods are usually at 3 weeks, 6 weeks, 6 months, and yearly intervals.\par Potential complications with total knee replacements include infection (less than 1%), nerve damage, by which we mean a peroneal nerve palsy, a foot drop, (a flapping foot with ambulation). This is particularly more apt to occur with a bad valgus (knock knee) deformity. The incidence can be quite high in this particular patient population. There are always areas of skin numbness but this is not an untoward effect nor do we consider it a complication.\par Other potential complications include dislocation of the patella component (less than 2%). The loosening of either the tibial or femoral component is much more infrequent. It usually occurs with infection or long-term use in a patient population that is at extreme risk (e.g. markedly overweight and not conscientious about their exercises). Major blood vessel damage is also extremely rare. Theoretically because of the anatomic proximity of the popliteal artery, it could be lacerated with subsequent repairs required. Albeit unlikely, a disruption of the popliteal artery could theoretically result in an amputation.\par Similarly, infection could theoretically result in an amputation if one were to grow out an organism that cannot be controlled with antibiotics.\par General medical complications include phlebitis for which we prophylactically anticoagulated but it could still occur and fatal pulmonary embolism has been reported. Cardiovascular problems, such as a heart attack or ischemia are always a concern with such hemodynamic changes in the blood vascular system. Other general complications are very rare but anything in medicine could theoretically happen. I think the patient understands the risk benefit ratio of total knee replacement and will think about whether they would like to pursue an operative or nonoperative option.\par

## 2019-03-06 NOTE — ADDENDUM
[FreeTextEntry1] : I, Amador Glass, acted solely as a scribe for Dr. Hernan Castillo on 03/06/2019.\par \par All medical record entries made by the scribe were at my, Dr. Hernan Castillo, direction and personally dictated by me on 03/06/2019. I have reviewed the chart and agree that the record accurately reflects my personal performance of the history, physical exam, assessment and plan. I have also personally directed, reviewed, and agreed with the chart.

## 2019-03-06 NOTE — PHYSICAL EXAM
[Normal] : Gait: normal [Poor Appearance] : well-appearing [Acute Distress] : not in acute distress [Obese] : not obese [de-identified] : General appearance: Well nourished, well developed, pleasant, alert, and oriented x3.\par Respiratory: Breathing not labored, in no acute distress.\par HEENT: Normocephalic. EOM intact. Sclerae are clear.\par CV: No apparent abnormalities. No lower leg edema. No varicosities. Pedal pulses are palpable.\par Neurologic: Sensation is intact to light touch in the upper and lower extremities. No muscle weakness.\par Dermatologic: No apparent skin lesions or rash.\par Spine: C spine and L spine appear normal and move freely, normal and nontender.\par Upper Extremities: Hands, wrists, and elbows are normal and move freely. Shoulders are normal and move freely. All range of motion is symmetrical.\par Normal body habitus. Pulses are palpable.\par Review of systems, please see form for complete details. Medical data sheet was reviewed.\par \par Right knee, FROM hip, large effusion, 10 - 90, moderate crepitus, + medial pain, no lateral pain, no Lachman, no pivot shift, no anterior drawer, no posterior drawer, stable, varus alignment  \par Left knee, FROM hip, midline incision well healed, no effusion, 0-130, no crepitus, no medial pain, no lateral pain, no anterior or posterior drawer, stable, anatomic alignment, neurovascularly intact  [de-identified] : Left Knee xrays, taken at the office today:\par Standing AP, Lateral, and Merchant films show:\par Total knee replacement hardware in neutral alignment, without evidence of loosening, well centered patella \par \par \par Xrays, taken at the office today show:\par \par Right Knee xrays:\par Standing AP, Lateral, and Merchant films:\par Severe medial DJD, loss of the medial joint space, Grade 4 changes present, Sclerosis and Osteophytes seen, varus alignment, severe patellofemoral arthritis

## 2019-03-06 NOTE — HISTORY OF PRESENT ILLNESS
[Pain Location] : pain [de-identified] : Patient is a 73 year old female who is s/p left TKA from 2/12/18. patient states no pain, no buckling, no locking, no clicking, no swelling \par Patient c/o diffuse chronic right knee pain. positive buckling, locking, clicking and swelling

## 2019-03-17 ENCOUNTER — TRANSCRIPTION ENCOUNTER (OUTPATIENT)
Age: 74
End: 2019-03-17

## 2019-03-18 ENCOUNTER — APPOINTMENT (OUTPATIENT)
Dept: ORTHOPEDIC SURGERY | Facility: HOSPITAL | Age: 74
End: 2019-03-18

## 2019-03-18 ENCOUNTER — INPATIENT (INPATIENT)
Facility: HOSPITAL | Age: 74
LOS: 1 days | Discharge: HOME HEALTH | DRG: 470 | End: 2019-03-20
Attending: ORTHOPAEDIC SURGERY | Admitting: ORTHOPAEDIC SURGERY
Payer: MEDICARE

## 2019-03-18 ENCOUNTER — TRANSCRIPTION ENCOUNTER (OUTPATIENT)
Age: 74
End: 2019-03-18

## 2019-03-18 VITALS
OXYGEN SATURATION: 98 % | SYSTOLIC BLOOD PRESSURE: 137 MMHG | TEMPERATURE: 98 F | DIASTOLIC BLOOD PRESSURE: 73 MMHG | WEIGHT: 192.02 LBS | HEART RATE: 80 BPM | RESPIRATION RATE: 16 BRPM

## 2019-03-18 DIAGNOSIS — M17.11 UNILATERAL PRIMARY OSTEOARTHRITIS, RIGHT KNEE: ICD-10-CM

## 2019-03-18 DIAGNOSIS — Z98.41 CATARACT EXTRACTION STATUS, RIGHT EYE: Chronic | ICD-10-CM

## 2019-03-18 DIAGNOSIS — Z96.652 PRESENCE OF LEFT ARTIFICIAL KNEE JOINT: Chronic | ICD-10-CM

## 2019-03-18 LAB
GLUCOSE BLDC GLUCOMTR-MCNC: 130 MG/DL — HIGH (ref 70–99)
GLUCOSE BLDC GLUCOMTR-MCNC: 140 MG/DL — HIGH (ref 70–99)
GLUCOSE BLDC GLUCOMTR-MCNC: 144 MG/DL — HIGH (ref 70–99)

## 2019-03-18 PROCEDURE — 20985 CPTR-ASST DIR MS PX: CPT

## 2019-03-18 PROCEDURE — 27447 TOTAL KNEE ARTHROPLASTY: CPT | Mod: AS,RT

## 2019-03-18 PROCEDURE — 73560 X-RAY EXAM OF KNEE 1 OR 2: CPT | Mod: 26,RT

## 2019-03-18 PROCEDURE — 99222 1ST HOSP IP/OBS MODERATE 55: CPT

## 2019-03-18 PROCEDURE — 27447 TOTAL KNEE ARTHROPLASTY: CPT | Mod: RT

## 2019-03-18 PROCEDURE — 20985 CPTR-ASST DIR MS PX: CPT | Mod: AS

## 2019-03-18 RX ORDER — OXYCODONE HYDROCHLORIDE 5 MG/1
5 TABLET ORAL EVERY 4 HOURS
Qty: 0 | Refills: 0 | Status: DISCONTINUED | OUTPATIENT
Start: 2019-03-18 | End: 2019-03-20

## 2019-03-18 RX ORDER — VANCOMYCIN HCL 1 G
1250 VIAL (EA) INTRAVENOUS ONCE
Qty: 0 | Refills: 0 | Status: COMPLETED | OUTPATIENT
Start: 2019-03-18 | End: 2019-03-18

## 2019-03-18 RX ORDER — HYDROCHLOROTHIAZIDE 25 MG
25 TABLET ORAL DAILY
Qty: 0 | Refills: 0 | Status: DISCONTINUED | OUTPATIENT
Start: 2019-03-20 | End: 2019-03-20

## 2019-03-18 RX ORDER — CELECOXIB 200 MG/1
400 CAPSULE ORAL ONCE
Qty: 0 | Refills: 0 | Status: COMPLETED | OUTPATIENT
Start: 2019-03-18 | End: 2019-03-18

## 2019-03-18 RX ORDER — ACETAMINOPHEN 500 MG
975 TABLET ORAL EVERY 8 HOURS
Qty: 0 | Refills: 0 | Status: DISCONTINUED | OUTPATIENT
Start: 2019-03-18 | End: 2019-03-20

## 2019-03-18 RX ORDER — OXYCODONE HYDROCHLORIDE 5 MG/1
10 TABLET ORAL EVERY 12 HOURS
Qty: 0 | Refills: 0 | Status: DISCONTINUED | OUTPATIENT
Start: 2019-03-18 | End: 2019-03-20

## 2019-03-18 RX ORDER — OXYCODONE HYDROCHLORIDE 5 MG/1
10 TABLET ORAL EVERY 4 HOURS
Qty: 0 | Refills: 0 | Status: DISCONTINUED | OUTPATIENT
Start: 2019-03-18 | End: 2019-03-20

## 2019-03-18 RX ORDER — VANCOMYCIN HCL 1 G
1250 VIAL (EA) INTRAVENOUS
Qty: 0 | Refills: 0 | Status: COMPLETED | OUTPATIENT
Start: 2019-03-18 | End: 2019-03-18

## 2019-03-18 RX ORDER — CELECOXIB 200 MG/1
200 CAPSULE ORAL
Qty: 0 | Refills: 0 | Status: DISCONTINUED | OUTPATIENT
Start: 2019-03-20 | End: 2019-03-20

## 2019-03-18 RX ORDER — SODIUM CHLORIDE 9 MG/ML
1000 INJECTION, SOLUTION INTRAVENOUS
Qty: 0 | Refills: 0 | Status: DISCONTINUED | OUTPATIENT
Start: 2019-03-18 | End: 2019-03-20

## 2019-03-18 RX ORDER — MAGNESIUM HYDROXIDE 400 MG/1
30 TABLET, CHEWABLE ORAL DAILY
Qty: 0 | Refills: 0 | Status: DISCONTINUED | OUTPATIENT
Start: 2019-03-18 | End: 2019-03-20

## 2019-03-18 RX ORDER — FENTANYL CITRATE 50 UG/ML
50 INJECTION INTRAVENOUS
Qty: 0 | Refills: 0 | Status: DISCONTINUED | OUTPATIENT
Start: 2019-03-18 | End: 2019-03-20

## 2019-03-18 RX ORDER — ASPIRIN/CALCIUM CARB/MAGNESIUM 324 MG
325 TABLET ORAL
Qty: 0 | Refills: 0 | Status: DISCONTINUED | OUTPATIENT
Start: 2019-03-19 | End: 2019-03-20

## 2019-03-18 RX ORDER — CEFAZOLIN SODIUM 1 G
2000 VIAL (EA) INJECTION
Qty: 0 | Refills: 0 | Status: COMPLETED | OUTPATIENT
Start: 2019-03-18 | End: 2019-03-19

## 2019-03-18 RX ORDER — OXYCODONE HYDROCHLORIDE 5 MG/1
10 TABLET ORAL ONCE
Qty: 0 | Refills: 0 | Status: DISCONTINUED | OUTPATIENT
Start: 2019-03-18 | End: 2019-03-18

## 2019-03-18 RX ORDER — DOCUSATE SODIUM 100 MG
100 CAPSULE ORAL THREE TIMES A DAY
Qty: 0 | Refills: 0 | Status: DISCONTINUED | OUTPATIENT
Start: 2019-03-18 | End: 2019-03-20

## 2019-03-18 RX ORDER — SENNA PLUS 8.6 MG/1
2 TABLET ORAL AT BEDTIME
Qty: 0 | Refills: 0 | Status: DISCONTINUED | OUTPATIENT
Start: 2019-03-18 | End: 2019-03-20

## 2019-03-18 RX ORDER — AMLODIPINE BESYLATE 2.5 MG/1
5 TABLET ORAL DAILY
Qty: 0 | Refills: 0 | Status: DISCONTINUED | OUTPATIENT
Start: 2019-03-20 | End: 2019-03-20

## 2019-03-18 RX ORDER — AMLODIPINE BESYLATE 2.5 MG/1
1 TABLET ORAL
Qty: 0 | Refills: 0 | COMMUNITY

## 2019-03-18 RX ORDER — HYDROMORPHONE HYDROCHLORIDE 2 MG/ML
0.5 INJECTION INTRAMUSCULAR; INTRAVENOUS; SUBCUTANEOUS EVERY 4 HOURS
Qty: 0 | Refills: 0 | Status: DISCONTINUED | OUTPATIENT
Start: 2019-03-18 | End: 2019-03-20

## 2019-03-18 RX ORDER — ONDANSETRON 8 MG/1
4 TABLET, FILM COATED ORAL ONCE
Qty: 0 | Refills: 0 | Status: DISCONTINUED | OUTPATIENT
Start: 2019-03-18 | End: 2019-03-18

## 2019-03-18 RX ORDER — SODIUM CHLORIDE 9 MG/ML
1000 INJECTION, SOLUTION INTRAVENOUS
Qty: 0 | Refills: 0 | Status: DISCONTINUED | OUTPATIENT
Start: 2019-03-18 | End: 2019-03-18

## 2019-03-18 RX ORDER — FERROUS SULFATE 325(65) MG
325 TABLET ORAL DAILY
Qty: 0 | Refills: 0 | Status: DISCONTINUED | OUTPATIENT
Start: 2019-03-18 | End: 2019-03-20

## 2019-03-18 RX ORDER — OMEGA-3 ACID ETHYL ESTERS 1 G
0 CAPSULE ORAL
Qty: 0 | Refills: 0 | COMMUNITY

## 2019-03-18 RX ORDER — ONDANSETRON 8 MG/1
4 TABLET, FILM COATED ORAL EVERY 6 HOURS
Qty: 0 | Refills: 0 | Status: DISCONTINUED | OUTPATIENT
Start: 2019-03-18 | End: 2019-03-20

## 2019-03-18 RX ORDER — ACETAMINOPHEN 500 MG
650 TABLET ORAL EVERY 6 HOURS
Qty: 0 | Refills: 0 | Status: DISCONTINUED | OUTPATIENT
Start: 2019-03-18 | End: 2019-03-20

## 2019-03-18 RX ADMIN — Medication 975 MILLIGRAM(S): at 21:30

## 2019-03-18 RX ADMIN — Medication 100 MILLIGRAM(S): at 20:47

## 2019-03-18 RX ADMIN — GABAPENTIN 600 MILLIGRAM(S): 400 CAPSULE ORAL at 20:47

## 2019-03-18 RX ADMIN — SODIUM CHLORIDE 3 MILLILITER(S): 9 INJECTION INTRAMUSCULAR; INTRAVENOUS; SUBCUTANEOUS at 20:48

## 2019-03-18 RX ADMIN — CELECOXIB 400 MILLIGRAM(S): 200 CAPSULE ORAL at 11:28

## 2019-03-18 RX ADMIN — OXYCODONE HYDROCHLORIDE 10 MILLIGRAM(S): 5 TABLET ORAL at 11:28

## 2019-03-18 RX ADMIN — OXYCODONE HYDROCHLORIDE 10 MILLIGRAM(S): 5 TABLET ORAL at 20:48

## 2019-03-18 RX ADMIN — Medication 166.67 MILLIGRAM(S): at 23:17

## 2019-03-18 RX ADMIN — SENNA PLUS 2 TABLET(S): 8.6 TABLET ORAL at 20:47

## 2019-03-18 RX ADMIN — GABAPENTIN 600 MILLIGRAM(S): 400 CAPSULE ORAL at 23:17

## 2019-03-18 RX ADMIN — OXYCODONE HYDROCHLORIDE 10 MILLIGRAM(S): 5 TABLET ORAL at 21:30

## 2019-03-18 RX ADMIN — Medication 166.67 MILLIGRAM(S): at 11:55

## 2019-03-18 RX ADMIN — GABAPENTIN 600 MILLIGRAM(S): 400 CAPSULE ORAL at 11:28

## 2019-03-18 RX ADMIN — SODIUM CHLORIDE 100 MILLILITER(S): 9 INJECTION, SOLUTION INTRAVENOUS at 23:17

## 2019-03-18 RX ADMIN — Medication 975 MILLIGRAM(S): at 20:48

## 2019-03-18 NOTE — DISCHARGE NOTE PROVIDER - HOSPITAL COURSE
The patient underwent a RIGHT TOTAL KNEE REPLACEMENT on 3/18/19. The patient received antibiotics consistent with SCIP guidelines. The patient underwent the procedure and had no intra-operative complications. Post-operatively, the patient was seen by medicine and PT. The patient received ASPIRIN for DVTP. The patient received pain medications per orthopedic pain management protocol and the pain was appropriately controlled. The patient did not have any post-operative medical complications. The patient was discharged in stable condition.

## 2019-03-18 NOTE — PHYSICAL THERAPY INITIAL EVALUATION ADULT - ADDITIONAL COMMENTS
Pt lives alone in a private home. 4 steps to enter with handrails, no steps inside. Pt was independent PTA with rollator. Pt owns rollator only.

## 2019-03-18 NOTE — DISCHARGE NOTE PROVIDER - NSDCFUADDINST_GEN_ALL_CORE_FT
The patient will be seen in the office in 3 weeks for wound check. Sutures/Staples/Tape will be removed at that time. Patient may shower after post-op day #3 (3/21/19). The dressing is to be removed on post op day #9 (3/27/19). IF THE DRESSING BECOMES SOILED BEFORE THE REMOVAL DATE, CHANGE WITH A SIMILAR DRESSING. IF THE DRESSING BECOMES STAINED WITH DISCHARGE, CONTACT THE OFFICE FOR FURTHER DIRECTIONS. The patient will contact the office if the wound becomes red, has increasing pain, develops bleeding or discharge, an injury occurs, or has other concerns. The patient will continue PT consistent with total knee replacement. The patient will continue aspirin 325mg twice daily for 6 weeks for blood clot prevention. The patient will take OXYCODONE AND TYLENOL for pain control and titrate according to prescription and patient needs. The patient will take Senna-S while taking oxycodone to prevent narcotic associated constipation.  Additionally, increase water intake (drink at least 8 glasses of water daily) and try adding fiber to the diet by eating fruits, vegetables and foods that are rich in grains. If constipation is experienced, contact the medical/primary care provider to discuss further treatment options. The patient is FULL weight bearing. Elevation of the lower leg is recommended to reduce swelling.

## 2019-03-18 NOTE — PHYSICAL THERAPY INITIAL EVALUATION ADULT - CRITERIA FOR SKILLED THERAPEUTIC INTERVENTIONS
anticipated equipment needs at discharge/anticipated discharge recommendation/Home with RW, Home PT/impairments found

## 2019-03-18 NOTE — CONSULT NOTE ADULT - SUBJECTIVE AND OBJECTIVE BOX
PMD : Mello  Cardio : none    chief complaint of right knee pain      HPI:  73 year old female with PMH of HTN, chronic right knee pain 2/2 primary OA presents for elective Rt knee replacement. She is s/p Right total knee arthroplasty POD#0, seen in PACU, Doing well.       PAST MEDICAL & SURGICAL HISTORY:  Risk factors for obstructive sleep apnea  OA (osteoarthritis)  Hypertension  H/O total knee replacement, left: 2018  S/P cataract extraction, right      Social History:  Tabacco - freddy  ETOH - denies  Illicit drug abuse - denies    FAMILY HISTORY:  No pertinent family history in first degree relatives      Allergies    No Known Allergies    Intolerances        HOME MEDICATIONS :   · 	amLODIPine 5 mg oral tablet: Last Dose Taken:  , 1 tab(s) orally once a day  · 	Omega-3 oral capsule: Last Dose Taken:  , orally once a day  · 	hydroCHLOROthiazide 25 mg oral tablet: Last Dose Taken:  , 1 tab(s) orally once a day      REVIEW OF SYSTEMS:    CONSTITUTIONAL: No fever,  fatigue  EYES: No eye pain, visual disturbances, or discharge  NECK: No pain or stiffness  RESPIRATORY: No cough, wheezing, chills No shortness of breath  CARDIOVASCULAR: No chest pain, palpitations, dizziness, or leg swelling  GASTROINTESTINAL: No abdominal or epigastric pain. No nausea, vomiting  GENITOURINARY: No dysuria, frequency, hematuria, or incontinence  NEUROLOGICAL: No headaches, memory loss  SKIN: No itching, burning, rashes, or lesions   ENDOCRINE: No heat or cold intolerance; No hair loss  MUSCULOSKELETAL: rt knee pain  PSYCHIATRIC: No depression, anxiety, mood swings, or difficulty sleeping  HEME/LYMPH: No easy bruising, or bleeding gums      MEDICATIONS  (STANDING):  acetaminophen  IVPB .. 1000 milliGRAM(s) IV Intermittent once  BUpivacaine liposome 1.3% Injectable 20 milliLiter(s) Local Injection once  ceFAZolin   IVPB 2000 milliGRAM(s) IV Intermittent once  ceFAZolin   IVPB 2000 milliGRAM(s) IV Intermittent <User Schedule>  gabapentin 600 milliGRAM(s) Oral five times a day  lactated ringers. 1000 milliLiter(s) (100 mL/Hr) IV Continuous <Continuous>  sodium chloride 0.9% lock flush 3 milliLiter(s) IV Push every 8 hours  tranexamic acid IVPB 850 milliGRAM(s) IV Intermittent once  tranexamic acid IVPB 850 milliGRAM(s) IV Intermittent once  vancomycin  IVPB 1250 milliGRAM(s) IV Intermittent <User Schedule>    MEDICATIONS  (PRN):  fentaNYL    Injectable 50 MICROGram(s) IV Push every 10 minutes PRN Moderate Pain (4 - 6)  ondansetron Injectable 4 milliGRAM(s) IV Push once PRN Nausea and/or Vomiting      Vital Signs Last 24 Hrs  T(C): 36.2 (18 Mar 2019 14:39), Max: 36.6 (18 Mar 2019 10:58)  T(F): 97.1 (18 Mar 2019 14:39), Max: 97.9 (18 Mar 2019 10:58)  HR: 79 (18 Mar 2019 14:50) (79 - 81)  BP: 107/67 (18 Mar 2019 14:50) (107/67 - 137/73)  BP(mean): --  RR: 12 (18 Mar 2019 14:50) (12 - 16)  SpO2: 95% (18 Mar 2019 14:50) (95% - 98%)    PHYSICAL EXAM:    GENERAL: NAD, well-groomed, well-developed  HEAD:  Atraumatic, Normocephalic  EYES: EOMI, PERRLA, conjunctiva and sclera clear  NECK: Supple,  NERVOUS SYSTEM:  Alert & Oriented X3, Good concentration  CHEST/LUNG: CTA  b/l,  no rales, rhonchi  HEART: Regular rate and rhythm; No murmurs  EXTREMITIES:  No clubbing, cyanosis, or edema ,   SKIN: No rashes or lesions    LABS:      reviewed.             RADIOLOGY & ADDITIONAL STUDIES:  EKG - tracing reviewed  CXR - read reviewed - film not available  Office notes from PMD reviewed PMD : Ray  Cardio : none    chief complaint of right knee pain      HPI:  73 year old [place]bour[place] speaking female with PMH of HTN, chronic right knee pain 2/2 primary OA presents for elective Rt knee replacement. She is s/p Right total knee arthroplasty POD#0, seen in PACU, Doing well. denies any pain or nausea but is still drowsy.      PAST MEDICAL & SURGICAL HISTORY:  Risk factors for obstructive sleep apnea  OA (osteoarthritis)  Hypertension  H/O total knee replacement, left: 2018  S/P cataract extraction, right      Social History:  Tabacco - freddy  ETOH - denies  Illicit drug abuse - denies    FAMILY HISTORY:  No pertinent family history in first degree relatives      Allergies    No Known Allergies    Intolerances        HOME MEDICATIONS :   · 	amLODIPine 5 mg oral tablet: Last Dose Taken:  , 1 tab(s) orally once a day  · 	Omega-3 oral capsule: Last Dose Taken:  , orally once a day  · 	hydroCHLOROthiazide 25 mg oral tablet: Last Dose Taken:  , 1 tab(s) orally once a day      REVIEW OF SYSTEMS:    CONSTITUTIONAL: No fever,  fatigue  EYES: No eye pain, visual disturbances, or discharge  NECK: No pain or stiffness  RESPIRATORY: No cough, wheezing, chills No shortness of breath  CARDIOVASCULAR: No chest pain, palpitations, dizziness, or leg swelling  GASTROINTESTINAL: No abdominal or epigastric pain. No nausea, vomiting  GENITOURINARY: No dysuria, frequency, hematuria, or incontinence  NEUROLOGICAL: No headaches, memory loss  SKIN: No itching, burning, rashes, or lesions   ENDOCRINE: No heat or cold intolerance; No hair loss  MUSCULOSKELETAL: rt knee pain  PSYCHIATRIC: No depression, anxiety, mood swings, or difficulty sleeping  HEME/LYMPH: No easy bruising, or bleeding gums      MEDICATIONS  (STANDING):  acetaminophen  IVPB .. 1000 milliGRAM(s) IV Intermittent once  BUpivacaine liposome 1.3% Injectable 20 milliLiter(s) Local Injection once  ceFAZolin   IVPB 2000 milliGRAM(s) IV Intermittent once  ceFAZolin   IVPB 2000 milliGRAM(s) IV Intermittent <User Schedule>  gabapentin 600 milliGRAM(s) Oral five times a day  lactated ringers. 1000 milliLiter(s) (100 mL/Hr) IV Continuous <Continuous>  sodium chloride 0.9% lock flush 3 milliLiter(s) IV Push every 8 hours  tranexamic acid IVPB 850 milliGRAM(s) IV Intermittent once  tranexamic acid IVPB 850 milliGRAM(s) IV Intermittent once  vancomycin  IVPB 1250 milliGRAM(s) IV Intermittent <User Schedule>    MEDICATIONS  (PRN):  fentaNYL    Injectable 50 MICROGram(s) IV Push every 10 minutes PRN Moderate Pain (4 - 6)  ondansetron Injectable 4 milliGRAM(s) IV Push once PRN Nausea and/or Vomiting      Vital Signs Last 24 Hrs  T(C): 36.2 (18 Mar 2019 14:39), Max: 36.6 (18 Mar 2019 10:58)  T(F): 97.1 (18 Mar 2019 14:39), Max: 97.9 (18 Mar 2019 10:58)  HR: 79 (18 Mar 2019 14:50) (79 - 81)  BP: 107/67 (18 Mar 2019 14:50) (107/67 - 137/73)  BP(mean): --  RR: 12 (18 Mar 2019 14:50) (12 - 16)  SpO2: 95% (18 Mar 2019 14:50) (95% - 98%)    PHYSICAL EXAM:    GENERAL: NAD, well-groomed, well-developed  HEAD:  Atraumatic, Normocephalic  EYES: EOMI, PERRLA, conjunctiva and sclera clear  NECK: Supple,  NERVOUS SYSTEM:  Alert & Oriented X3, Good concentration  CHEST/LUNG: CTA  b/l,  no rales, rhonchi  HEART: Regular rate and rhythm; 3/6 systolic murmur in aortic area - difficult to auscultate as pt unable to follow instructions 2/2 drowsiness - unable to hold breath, will confirm on repeat exam   EXTREMITIES:  No clubbing, cyanosis, or edema ,   SKIN: No rashes or lesions    LABS:      reviewed.             RADIOLOGY & ADDITIONAL STUDIES:  EKG - tracing reviewed  CXR - read reviewed - film not available  Office notes from PMD reviewed

## 2019-03-18 NOTE — CONSULT NOTE ADULT - ASSESSMENT
73 year old female with PMH of HTN, chronic right knee pain 2/2 primary OA presents for elective Rt knee replacement. She is s/p Right total knee arthroplasty.    #Rt total knee replacement - Pain control   Bowel regimen   Incentive spirometry  DVT px - per ortho   PT    # HTN - Ct Amlodipine with hold parameters  hold HCTZ       labs in am   we will follow.

## 2019-03-18 NOTE — PROGRESS NOTE ADULT - SUBJECTIVE AND OBJECTIVE BOX
Orthopedic PA Postop Note  Patient S/P RIGHT TKA  Patient in bed comfortable   RIGHT Leg  Dressing C/D/I - ACE wrap without staining  DP Pulse intact   Calf Soft NT  Dorsi/Plantar Flexion/EHL/FHL intact   Sensation intact to light touch    Vital Signs Last 24 Hrs  T(C): 36.1 (18 Mar 2019 17:22), Max: 36.6 (18 Mar 2019 10:58)  T(F): 97 (18 Mar 2019 17:22), Max: 97.9 (18 Mar 2019 10:58)  HR: 87 (18 Mar 2019 17:22) (78 - 87)  BP: 108/69 (18 Mar 2019 17:22) (107/67 - 137/73)  BP(mean): --  RR: 19 (18 Mar 2019 17:22) (12 - 19)  SpO2: 96% (18 Mar 2019 17:22) (95% - 98%)    < from: Xray Knee 1 or 2 Views, Right (03.18.19 @ 14:40) >     EXAM:  KNEE-RIGHT                          PROCEDURE DATE:  03/18/2019          INTERPRETATION:  X-RAY RIGHT KNEE:    HISTORY: Post-operative.    DATE: 3/18/2019 2:30 PM    TECHNIQUE: AP and Lateral views were obtained.    FINDINGS: Status post right total knee replacement. Alignment appears to   be essentially anatomic. Air is noted in the soft tissues in keeping with   recent surgery.    IMPRESSION:    Status post total right knee replacement.                  VINCENT GUTIERREZ M.D., ATTENDINGRADIOLOGIST  This document has been electronically signed. Mar 18 2019  3:17PM                < end of copied text >      A/P: 73F S/P RIGHT  TKA  1. DVTP - ASA  2. Physical Therapy   3. Pain Control as clinically indicated Orthopedic PA Postop Note  Patient S/P RIGHT TKA  Patient in bed comfortable   RIGHT Leg  Dressing C/D/I   DP Pulse intact   Calf Soft NT  Dorsi/Plantar Flexion/EHL/FHL intact   Sensation intact to light touch    Vital Signs Last 24 Hrs  T(C): 36.1 (18 Mar 2019 17:22), Max: 36.6 (18 Mar 2019 10:58)  T(F): 97 (18 Mar 2019 17:22), Max: 97.9 (18 Mar 2019 10:58)  HR: 87 (18 Mar 2019 17:22) (78 - 87)  BP: 108/69 (18 Mar 2019 17:22) (107/67 - 137/73)  BP(mean): --  RR: 19 (18 Mar 2019 17:22) (12 - 19)  SpO2: 96% (18 Mar 2019 17:22) (95% - 98%)    < from: Xray Knee 1 or 2 Views, Right (03.18.19 @ 14:40) >     EXAM:  KNEE-RIGHT                          PROCEDURE DATE:  03/18/2019          INTERPRETATION:  X-RAY RIGHT KNEE:    HISTORY: Post-operative.    DATE: 3/18/2019 2:30 PM    TECHNIQUE: AP and Lateral views were obtained.    FINDINGS: Status post right total knee replacement. Alignment appears to   be essentially anatomic. Air is noted in the soft tissues in keeping with   recent surgery.    IMPRESSION:    Status post total right knee replacement.                  VINECNT GUTIERREZ M.D., ATTENDINGRADIOLOGIST  This document has been electronically signed. Mar 18 2019  3:17PM                < end of copied text >      A/P: 73F S/P RIGHT  TKA  1. DVTP - ASA  2. Physical Therapy   3. Pain Control as clinically indicated

## 2019-03-18 NOTE — DISCHARGE NOTE PROVIDER - NSDCCPCAREPLAN_GEN_ALL_CORE_FT
PRINCIPAL DISCHARGE DIAGNOSIS  Diagnosis: Osteoarthritis of right knee  Assessment and Plan of Treatment:

## 2019-03-18 NOTE — DISCHARGE NOTE PROVIDER - CARE PROVIDER_API CALL
Hernan Castillo)  Orthopaedic Surgery  [address] [place] [address]  [phone]  Fax: [phone]  Follow Up Time:

## 2019-03-18 NOTE — PHYSICAL THERAPY INITIAL EVALUATION ADULT - RANGE OF MOTION EXAMINATION, REHAB EVAL
bilateral upper extremity ROM was WFL (within functional limits)/right knee lacking approx 5-10 degrees extension to approx 65-70 degrees flexion (in sitting)/Left LE ROM was WFL (within functional limits)

## 2019-03-19 DIAGNOSIS — Z71.89 OTHER SPECIFIED COUNSELING: ICD-10-CM

## 2019-03-19 LAB
ANION GAP SERPL CALC-SCNC: 13 MMOL/L — SIGNIFICANT CHANGE UP (ref 5–17)
BLD GP AB SCN SERPL QL: SIGNIFICANT CHANGE UP
BUN SERPL-MCNC: 25 MG/DL — HIGH (ref 8–20)
CALCIUM SERPL-MCNC: 8.3 MG/DL — LOW (ref 8.6–10.2)
CHLORIDE SERPL-SCNC: 94 MMOL/L — LOW (ref 98–107)
CO2 SERPL-SCNC: 28 MMOL/L — SIGNIFICANT CHANGE UP (ref 22–29)
CREAT SERPL-MCNC: 0.9 MG/DL — SIGNIFICANT CHANGE UP (ref 0.5–1.3)
GLUCOSE SERPL-MCNC: 238 MG/DL — HIGH (ref 70–115)
HBA1C BLD-MCNC: 6.3 % — HIGH (ref 4–5.6)
HCT VFR BLD CALC: 35.2 % — LOW (ref 37–47)
HGB BLD-MCNC: 12.2 G/DL — SIGNIFICANT CHANGE UP (ref 12–16)
MCHC RBC-ENTMCNC: 29 PG — SIGNIFICANT CHANGE UP (ref 27–31)
MCHC RBC-ENTMCNC: 34.7 G/DL — SIGNIFICANT CHANGE UP (ref 32–36)
MCV RBC AUTO: 83.6 FL — SIGNIFICANT CHANGE UP (ref 81–99)
PLATELET # BLD AUTO: 250 K/UL — SIGNIFICANT CHANGE UP (ref 150–400)
POTASSIUM SERPL-MCNC: 3.5 MMOL/L — SIGNIFICANT CHANGE UP (ref 3.5–5.3)
POTASSIUM SERPL-SCNC: 3.5 MMOL/L — SIGNIFICANT CHANGE UP (ref 3.5–5.3)
RBC # BLD: 4.21 M/UL — LOW (ref 4.4–5.2)
RBC # FLD: 13.8 % — SIGNIFICANT CHANGE UP (ref 11–15.6)
SODIUM SERPL-SCNC: 135 MMOL/L — SIGNIFICANT CHANGE UP (ref 135–145)
WBC # BLD: 15.1 K/UL — HIGH (ref 4.8–10.8)
WBC # FLD AUTO: 15.1 K/UL — HIGH (ref 4.8–10.8)

## 2019-03-19 PROCEDURE — 99232 SBSQ HOSP IP/OBS MODERATE 35: CPT

## 2019-03-19 RX ORDER — POTASSIUM CHLORIDE 20 MEQ
40 PACKET (EA) ORAL ONCE
Qty: 0 | Refills: 0 | Status: COMPLETED | OUTPATIENT
Start: 2019-03-19 | End: 2019-03-19

## 2019-03-19 RX ORDER — ASPIRIN/CALCIUM CARB/MAGNESIUM 324 MG
1 TABLET ORAL
Qty: 84 | Refills: 0 | OUTPATIENT
Start: 2019-03-19

## 2019-03-19 RX ORDER — OXYCODONE HYDROCHLORIDE 5 MG/1
1 TABLET ORAL
Qty: 56 | Refills: 0 | OUTPATIENT
Start: 2019-03-19

## 2019-03-19 RX ORDER — SENNOSIDES/DOCUSATE SODIUM 8.6MG-50MG
2 TABLET ORAL
Qty: 20 | Refills: 0 | OUTPATIENT
Start: 2019-03-19

## 2019-03-19 RX ADMIN — OXYCODONE HYDROCHLORIDE 10 MILLIGRAM(S): 5 TABLET ORAL at 20:00

## 2019-03-19 RX ADMIN — Medication 975 MILLIGRAM(S): at 13:50

## 2019-03-19 RX ADMIN — GABAPENTIN 600 MILLIGRAM(S): 400 CAPSULE ORAL at 11:04

## 2019-03-19 RX ADMIN — Medication 975 MILLIGRAM(S): at 14:50

## 2019-03-19 RX ADMIN — Medication 40 MILLIEQUIVALENT(S): at 11:04

## 2019-03-19 RX ADMIN — Medication 975 MILLIGRAM(S): at 20:37

## 2019-03-19 RX ADMIN — Medication 975 MILLIGRAM(S): at 06:15

## 2019-03-19 RX ADMIN — OXYCODONE HYDROCHLORIDE 10 MILLIGRAM(S): 5 TABLET ORAL at 21:30

## 2019-03-19 RX ADMIN — GABAPENTIN 600 MILLIGRAM(S): 400 CAPSULE ORAL at 18:01

## 2019-03-19 RX ADMIN — GABAPENTIN 600 MILLIGRAM(S): 400 CAPSULE ORAL at 20:37

## 2019-03-19 RX ADMIN — Medication 975 MILLIGRAM(S): at 05:18

## 2019-03-19 RX ADMIN — SODIUM CHLORIDE 3 MILLILITER(S): 9 INJECTION INTRAMUSCULAR; INTRAVENOUS; SUBCUTANEOUS at 14:51

## 2019-03-19 RX ADMIN — Medication 100 MILLIGRAM(S): at 20:37

## 2019-03-19 RX ADMIN — Medication 975 MILLIGRAM(S): at 21:30

## 2019-03-19 RX ADMIN — GABAPENTIN 600 MILLIGRAM(S): 400 CAPSULE ORAL at 23:07

## 2019-03-19 RX ADMIN — Medication 100 MILLIGRAM(S): at 05:18

## 2019-03-19 RX ADMIN — Medication 325 MILLIGRAM(S): at 05:18

## 2019-03-19 RX ADMIN — Medication 100 MILLIGRAM(S): at 17:51

## 2019-03-19 RX ADMIN — SODIUM CHLORIDE 3 MILLILITER(S): 9 INJECTION INTRAMUSCULAR; INTRAVENOUS; SUBCUTANEOUS at 20:38

## 2019-03-19 RX ADMIN — OXYCODONE HYDROCHLORIDE 10 MILLIGRAM(S): 5 TABLET ORAL at 20:36

## 2019-03-19 RX ADMIN — OXYCODONE HYDROCHLORIDE 5 MILLIGRAM(S): 5 TABLET ORAL at 06:15

## 2019-03-19 RX ADMIN — OXYCODONE HYDROCHLORIDE 10 MILLIGRAM(S): 5 TABLET ORAL at 11:04

## 2019-03-19 RX ADMIN — Medication 325 MILLIGRAM(S): at 17:51

## 2019-03-19 RX ADMIN — OXYCODONE HYDROCHLORIDE 5 MILLIGRAM(S): 5 TABLET ORAL at 05:17

## 2019-03-19 RX ADMIN — OXYCODONE HYDROCHLORIDE 10 MILLIGRAM(S): 5 TABLET ORAL at 19:18

## 2019-03-19 RX ADMIN — Medication 325 MILLIGRAM(S): at 18:01

## 2019-03-19 RX ADMIN — SODIUM CHLORIDE 3 MILLILITER(S): 9 INJECTION INTRAMUSCULAR; INTRAVENOUS; SUBCUTANEOUS at 05:16

## 2019-03-19 RX ADMIN — OXYCODONE HYDROCHLORIDE 10 MILLIGRAM(S): 5 TABLET ORAL at 11:05

## 2019-03-19 RX ADMIN — SENNA PLUS 2 TABLET(S): 8.6 TABLET ORAL at 20:37

## 2019-03-19 NOTE — PROGRESS NOTE ADULT - SUBJECTIVE AND OBJECTIVE BOX
GARRET ARMSTRONGAMY    [id number]    73y      Female    CC: Rt knee pain s/p Rt TKR POD#1      INTERVAL HPI/OVERNIGHT EVENTS:    REVIEW OF SYSTEMS:    CONSTITUTIONAL: No fever, weight loss, or fatigue  RESPIRATORY: No cough, wheezing, hemoptysis; No shortness of breath  CARDIOVASCULAR: No chest pain, palpitations  GASTROINTESTINAL: No abdominal or epigastric pain. No nausea, vomiting  NEUROLOGICAL: No headaches, memory loss, loss of strength.  MISCELLANEOUS:      Vital Signs Last 24 Hrs  T(C): 36.9 (19 Mar 2019 07:56), Max: 36.9 (19 Mar 2019 07:56)  T(F): 98.4 (19 Mar 2019 07:56), Max: 98.4 (19 Mar 2019 07:56)  HR: 67 (19 Mar 2019 07:56) (67 - 87)  BP: 106/58 (19 Mar 2019 07:56) (106/58 - 137/73)  BP(mean): --  RR: 18 (19 Mar 2019 07:56) (12 - 19)  SpO2: 94% (19 Mar 2019 07:56) (93% - 98%)    PHYSICAL EXAM:    GENERAL: NAD, well-groomed  HEENT: PERRL, +EOMI  NECK: soft, Supple, No JVD,   CHEST/LUNG: Clear to percussion bilaterally; No wheezing  HEART: S1S2+, Regular rate and rhythm; No murmurs, rubs, or gallops  ABDOMEN: Soft, Nontender, Nondistended; Bowel sounds present  EXTREMITIES:  2+ Peripheral Pulses, No clubbing, cyanosis, or edema  SKIN: No rashes or lesions  NEURO: AAOX3, no focal deficits, no motor r sensory loss  PSYCH: normal mood      03-18 @ 07:01  -  03-19 @ 07:00  --------------------------------------------------------  IN: 2170 mL / OUT: 450 mL / NET: 1720 mL        LABS:                        12.2   15.1  )-----------( 250      ( 19 Mar 2019 07:21 )             35.2     03-19    135  |  94<L>  |  25.0<H>  ----------------------------<  238<H>  3.5   |  28.0  |  0.90    Ca    8.3<L>      19 Mar 2019 07:21              MEDICATIONS  (STANDING):  acetaminophen   Tablet .. 975 milliGRAM(s) Oral every 8 hours  acetaminophen  IVPB .. 1000 milliGRAM(s) IV Intermittent once  aspirin enteric coated 325 milliGRAM(s) Oral two times a day  BUpivacaine liposome 1.3% Injectable 20 milliLiter(s) Local Injection once  ceFAZolin   IVPB 2000 milliGRAM(s) IV Intermittent once  docusate sodium 100 milliGRAM(s) Oral three times a day  ferrous    sulfate 325 milliGRAM(s) Oral daily  gabapentin 600 milliGRAM(s) Oral five times a day  lactated ringers. 1000 milliLiter(s) (100 mL/Hr) IV Continuous <Continuous>  oxyCODONE  ER Tablet 10 milliGRAM(s) Oral every 12 hours  senna 2 Tablet(s) Oral at bedtime  sodium chloride 0.9% lock flush 3 milliLiter(s) IV Push every 8 hours  tranexamic acid IVPB 850 milliGRAM(s) IV Intermittent once  tranexamic acid IVPB 850 milliGRAM(s) IV Intermittent once    MEDICATIONS  (PRN):  acetaminophen   Tablet .. 650 milliGRAM(s) Oral every 6 hours PRN Temp greater or equal to 38C (100.4F)  aluminum hydroxide/magnesium hydroxide/simethicone Suspension 30 milliLiter(s) Oral four times a day PRN Indigestion  fentaNYL    Injectable 50 MICROGram(s) IV Push every 10 minutes PRN Moderate Pain (4 - 6)  HYDROmorphone  Injectable 0.5 milliGRAM(s) IV Push every 4 hours PRN Severe Pain (7 - 10)/breakthrough pain  magnesium hydroxide Suspension 30 milliLiter(s) Oral daily PRN Constipation  ondansetron Injectable 4 milliGRAM(s) IV Push every 6 hours PRN Nausea and/or Vomiting  oxyCODONE    IR 5 milliGRAM(s) Oral every 4 hours PRN Mild Pain (1 - 3)  oxyCODONE    IR 10 milliGRAM(s) Oral every 4 hours PRN Moderate Pain (4 - 6)      RADIOLOGY & ADDITIONAL TESTS: GARRET ARMSTRONGAMY    [id number]    73y      Female    [employer] translation services used on phone, son at bedside who speaks english.   CC: Rt knee pain s/p Rt TKR POD#1  knee pain is tolerable with meds  Feels light headed and dizzy since surgery  no other issues, no nausea  detailed PMH etc history confirmed     INTERVAL HPI/OVERNIGHT EVENTS: no acute events    REVIEW OF SYSTEMS:    CONSTITUTIONAL: No fever  RESPIRATORY: No cough, wheezing,; No shortness of breath  CARDIOVASCULAR: No chest pain, palpitations  GASTROINTESTINAL: . No nausea, vomiting        Vital Signs Last 24 Hrs  T(C): 36.9 (19 Mar 2019 07:56), Max: 36.9 (19 Mar 2019 07:56)  T(F): 98.4 (19 Mar 2019 07:56), Max: 98.4 (19 Mar 2019 07:56)  HR: 67 (19 Mar 2019 07:56) (67 - 87)  BP: 106/58 (19 Mar 2019 07:56) (106/58 - 137/73)  BP(mean): --  RR: 18 (19 Mar 2019 07:56) (12 - 19)  SpO2: 94% (19 Mar 2019 07:56) (93% - 98%)    PHYSICAL EXAM:    GENERAL: NAD, well-groomed  HEENT: PERRL, +EOMI  NECK: soft, Supple  CHEST/LUNG: Clear to percussion bilaterally; No wheezing  HEART: S1S2+, Regular rate and rhythm; No murmurs  EXTREMITIES:No clubbing, cyanosis, or edema  NEURO: AAOX3,         03-18 @ 07:01  -  03-19 @ 07:00  --------------------------------------------------------  IN: 2170 mL / OUT: 450 mL / NET: 1720 mL        LABS:                        12.2   15.1  )-----------( 250      ( 19 Mar 2019 07:21 )             35.2     03-19    135  |  94<L>  |  25.0<H>  ----------------------------<  238<H>  3.5   |  28.0  |  0.90    Ca    8.3<L>      19 Mar 2019 07:21              MEDICATIONS  (STANDING):  acetaminophen   Tablet .. 975 milliGRAM(s) Oral every 8 hours  acetaminophen  IVPB .. 1000 milliGRAM(s) IV Intermittent once  aspirin enteric coated 325 milliGRAM(s) Oral two times a day  BUpivacaine liposome 1.3% Injectable 20 milliLiter(s) Local Injection once  ceFAZolin   IVPB 2000 milliGRAM(s) IV Intermittent once  docusate sodium 100 milliGRAM(s) Oral three times a day  ferrous    sulfate 325 milliGRAM(s) Oral daily  gabapentin 600 milliGRAM(s) Oral five times a day  lactated ringers. 1000 milliLiter(s) (100 mL/Hr) IV Continuous <Continuous>  oxyCODONE  ER Tablet 10 milliGRAM(s) Oral every 12 hours  senna 2 Tablet(s) Oral at bedtime  sodium chloride 0.9% lock flush 3 milliLiter(s) IV Push every 8 hours  tranexamic acid IVPB 850 milliGRAM(s) IV Intermittent once  tranexamic acid IVPB 850 milliGRAM(s) IV Intermittent once    MEDICATIONS  (PRN):  acetaminophen   Tablet .. 650 milliGRAM(s) Oral every 6 hours PRN Temp greater or equal to 38C (100.4F)  aluminum hydroxide/magnesium hydroxide/simethicone Suspension 30 milliLiter(s) Oral four times a day PRN Indigestion  fentaNYL    Injectable 50 MICROGram(s) IV Push every 10 minutes PRN Moderate Pain (4 - 6)  HYDROmorphone  Injectable 0.5 milliGRAM(s) IV Push every 4 hours PRN Severe Pain (7 - 10)/breakthrough pain  magnesium hydroxide Suspension 30 milliLiter(s) Oral daily PRN Constipation  ondansetron Injectable 4 milliGRAM(s) IV Push every 6 hours PRN Nausea and/or Vomiting  oxyCODONE    IR 5 milliGRAM(s) Oral every 4 hours PRN Mild Pain (1 - 3)  oxyCODONE    IR 10 milliGRAM(s) Oral every 4 hours PRN Moderate Pain (4 - 6)      RADIOLOGY & ADDITIONAL TESTS:

## 2019-03-19 NOTE — PROGRESS NOTE ADULT - ASSESSMENT
73 year old female with PMH of HTN, chronic right knee pain 2/2 primary OA presents for elective Rt knee replacement. She is s/p Right total knee arthroplasty.    #Rt total knee replacement - Pain control   Bowel regimen   Incentive spirometry  DVT px - per ortho   PT    # acute blood loss anemia - Ct to monitor  # leucocytosis - Reactive   # Elevated sugars- check A1C   # Hypokalemia - replete   # HTN - Ct Amlodipine with hold parameters  hold HCTZ 73 year old female with PMH of HTN, chronic right knee pain 2/2 primary OA presents for elective Rt knee replacement. She is s/p Right total knee arthroplasty.    #Rt total knee replacement - Pain control   Bowel regimen   Incentive spirometry  DVT px - per ortho   PT    Post operative dizziness - possibly 2/2 anesthetics, narcotics, vitals stable, supportive care.     # acute blood loss anemia - Ct to monitor  # leucocytosis - Reactive   # Elevated sugars- check A1C   # Hypokalemia - replete   # HTN - Ct Amlodipine with hold parameters  hold HCTZ     we will follow

## 2019-03-19 NOTE — PROGRESS NOTE ADULT - SUBJECTIVE AND OBJECTIVE BOX
Patient seen and examined at bedside. comfortable in bed, pain controlled. Denies fever/chills, SOB/chest pain, abdominal pain, numbness/tingling. no complaints.    Vital Signs Last 24 Hrs  T(C): 36.6 (19 Mar 2019 04:42), Max: 36.8 (19 Mar 2019 00:34)  T(F): 97.8 (19 Mar 2019 04:42), Max: 98.2 (19 Mar 2019 00:34)  HR: 78 (19 Mar 2019 04:42) (78 - 87)  BP: 126/67 (19 Mar 2019 04:42) (107/67 - 137/73)  BP(mean): --  RR: 19 (19 Mar 2019 04:42) (12 - 19)  SpO2: 94% (19 Mar 2019 04:42) (93% - 98%)    RLE: Dressing C/D/I. SILT. + dorsi/plantarflexion. DP 2+. Calf soft NT B/L.    A/P: 73 y.o F s/p right TKA POD #1  - WBAT  - DVTP  - f/u AM labs  - d/c planning - home possibly tomorrow if cleared by PT and medicine

## 2019-03-20 ENCOUNTER — TRANSCRIPTION ENCOUNTER (OUTPATIENT)
Age: 74
End: 2019-03-20

## 2019-03-20 VITALS
HEART RATE: 72 BPM | SYSTOLIC BLOOD PRESSURE: 127 MMHG | TEMPERATURE: 98 F | DIASTOLIC BLOOD PRESSURE: 70 MMHG | RESPIRATION RATE: 18 BRPM

## 2019-03-20 LAB
ANION GAP SERPL CALC-SCNC: 12 MMOL/L — SIGNIFICANT CHANGE UP (ref 5–17)
BUN SERPL-MCNC: 29 MG/DL — HIGH (ref 8–20)
CALCIUM SERPL-MCNC: 7.8 MG/DL — LOW (ref 8.6–10.2)
CHLORIDE SERPL-SCNC: 101 MMOL/L — SIGNIFICANT CHANGE UP (ref 98–107)
CO2 SERPL-SCNC: 28 MMOL/L — SIGNIFICANT CHANGE UP (ref 22–29)
CREAT SERPL-MCNC: 0.8 MG/DL — SIGNIFICANT CHANGE UP (ref 0.5–1.3)
GLUCOSE SERPL-MCNC: 134 MG/DL — HIGH (ref 70–115)
HCT VFR BLD CALC: 31.4 % — LOW (ref 37–47)
HGB BLD-MCNC: 10.7 G/DL — LOW (ref 12–16)
MCHC RBC-ENTMCNC: 29.1 PG — SIGNIFICANT CHANGE UP (ref 27–31)
MCHC RBC-ENTMCNC: 34.1 G/DL — SIGNIFICANT CHANGE UP (ref 32–36)
MCV RBC AUTO: 85.3 FL — SIGNIFICANT CHANGE UP (ref 81–99)
PLATELET # BLD AUTO: 216 K/UL — SIGNIFICANT CHANGE UP (ref 150–400)
POTASSIUM SERPL-MCNC: 3.9 MMOL/L — SIGNIFICANT CHANGE UP (ref 3.5–5.3)
POTASSIUM SERPL-SCNC: 3.9 MMOL/L — SIGNIFICANT CHANGE UP (ref 3.5–5.3)
RBC # BLD: 3.68 M/UL — LOW (ref 4.4–5.2)
RBC # FLD: 14.3 % — SIGNIFICANT CHANGE UP (ref 11–15.6)
SODIUM SERPL-SCNC: 141 MMOL/L — SIGNIFICANT CHANGE UP (ref 135–145)
WBC # BLD: 12.1 K/UL — HIGH (ref 4.8–10.8)
WBC # FLD AUTO: 12.1 K/UL — HIGH (ref 4.8–10.8)

## 2019-03-20 PROCEDURE — 85027 COMPLETE CBC AUTOMATED: CPT

## 2019-03-20 PROCEDURE — 86900 BLOOD TYPING SEROLOGIC ABO: CPT

## 2019-03-20 PROCEDURE — 80048 BASIC METABOLIC PNL TOTAL CA: CPT

## 2019-03-20 PROCEDURE — 86850 RBC ANTIBODY SCREEN: CPT

## 2019-03-20 PROCEDURE — 36415 COLL VENOUS BLD VENIPUNCTURE: CPT

## 2019-03-20 PROCEDURE — T1013: CPT

## 2019-03-20 PROCEDURE — 73560 X-RAY EXAM OF KNEE 1 OR 2: CPT

## 2019-03-20 PROCEDURE — 97116 GAIT TRAINING THERAPY: CPT

## 2019-03-20 PROCEDURE — C1713: CPT

## 2019-03-20 PROCEDURE — 82962 GLUCOSE BLOOD TEST: CPT

## 2019-03-20 PROCEDURE — C1776: CPT

## 2019-03-20 PROCEDURE — 83036 HEMOGLOBIN GLYCOSYLATED A1C: CPT

## 2019-03-20 PROCEDURE — 86901 BLOOD TYPING SEROLOGIC RH(D): CPT

## 2019-03-20 PROCEDURE — 99232 SBSQ HOSP IP/OBS MODERATE 35: CPT

## 2019-03-20 PROCEDURE — 97110 THERAPEUTIC EXERCISES: CPT

## 2019-03-20 PROCEDURE — 97163 PT EVAL HIGH COMPLEX 45 MIN: CPT

## 2019-03-20 RX ADMIN — Medication 975 MILLIGRAM(S): at 11:34

## 2019-03-20 RX ADMIN — GABAPENTIN 600 MILLIGRAM(S): 400 CAPSULE ORAL at 11:34

## 2019-03-20 RX ADMIN — OXYCODONE HYDROCHLORIDE 10 MILLIGRAM(S): 5 TABLET ORAL at 08:40

## 2019-03-20 RX ADMIN — GABAPENTIN 600 MILLIGRAM(S): 400 CAPSULE ORAL at 08:06

## 2019-03-20 RX ADMIN — Medication 100 MILLIGRAM(S): at 05:47

## 2019-03-20 RX ADMIN — MAGNESIUM HYDROXIDE 30 MILLILITER(S): 400 TABLET, CHEWABLE ORAL at 11:36

## 2019-03-20 RX ADMIN — Medication 325 MILLIGRAM(S): at 11:34

## 2019-03-20 RX ADMIN — Medication 975 MILLIGRAM(S): at 12:07

## 2019-03-20 RX ADMIN — OXYCODONE HYDROCHLORIDE 10 MILLIGRAM(S): 5 TABLET ORAL at 08:06

## 2019-03-20 RX ADMIN — SODIUM CHLORIDE 3 MILLILITER(S): 9 INJECTION INTRAMUSCULAR; INTRAVENOUS; SUBCUTANEOUS at 05:44

## 2019-03-20 RX ADMIN — Medication 100 MILLIGRAM(S): at 11:34

## 2019-03-20 RX ADMIN — CELECOXIB 200 MILLIGRAM(S): 200 CAPSULE ORAL at 06:25

## 2019-03-20 RX ADMIN — Medication 975 MILLIGRAM(S): at 05:47

## 2019-03-20 RX ADMIN — CELECOXIB 200 MILLIGRAM(S): 200 CAPSULE ORAL at 05:47

## 2019-03-20 RX ADMIN — SODIUM CHLORIDE 3 MILLILITER(S): 9 INJECTION INTRAMUSCULAR; INTRAVENOUS; SUBCUTANEOUS at 11:29

## 2019-03-20 RX ADMIN — Medication 975 MILLIGRAM(S): at 06:25

## 2019-03-20 RX ADMIN — Medication 325 MILLIGRAM(S): at 05:47

## 2019-03-20 NOTE — PROGRESS NOTE ADULT - SUBJECTIVE AND OBJECTIVE BOX
GARRET VALLE    [id number]    73y      Female    Turkish translation services used on phone  CC: Rt knee pain s/p Rt TKR POD#2  knee pain is tolerable with meds  Feels light headed and dizzy since surgery  no other issues, no nausea    INTERVAL HPI/OVERNIGHT EVENTS: no acute events    REVIEW OF SYSTEMS:    CONSTITUTIONAL: No fever  RESPIRATORY: No cough, wheezing,; No shortness of breath  CARDIOVASCULAR: No chest pain, palpitations  GASTROINTESTINAL: . No nausea, vomiting        Vital Signs Last 24 Hrs  T(C): 37 (20 Mar 2019 05:20), Max: 37.2 (20 Mar 2019 00:21)  T(F): 98.6 (20 Mar 2019 05:20), Max: 99 (20 Mar 2019 00:21)  HR: 67 (20 Mar 2019 05:20) (67 - 73)  BP: 116/73 (20 Mar 2019 05:20) (115/67 - 122/77)  BP(mean): --  RR: 18 (20 Mar 2019 05:20) (18 - 18)  SpO2: 95% (20 Mar 2019 05:20) (94% - 95%)    PHYSICAL EXAM:    GENERAL: NAD, well-groomed  HEENT: PERRL, +EOMI  NECK: soft, Supple  CHEST/LUNG: Clear to percussion bilaterally; No wheezing  HEART: S1S2+, Regular rate and rhythm; No murmurs  EXTREMITIES:No clubbing, cyanosis, or edema  NEURO: AAOX3,         LABS:                        10.7   12.1  )-----------( 216      ( 20 Mar 2019 06:48 )             31.4   03-20    141  |  101  |  29.0<H>  ----------------------------<  134<H>  3.9   |  28.0  |  0.80    Ca    7.8<L>      20 Mar 2019 06:48                MEDICATIONS  (STANDING):  acetaminophen   Tablet .. 975 milliGRAM(s) Oral every 8 hours  acetaminophen  IVPB .. 1000 milliGRAM(s) IV Intermittent once  aspirin enteric coated 325 milliGRAM(s) Oral two times a day  BUpivacaine liposome 1.3% Injectable 20 milliLiter(s) Local Injection once  ceFAZolin   IVPB 2000 milliGRAM(s) IV Intermittent once  docusate sodium 100 milliGRAM(s) Oral three times a day  ferrous    sulfate 325 milliGRAM(s) Oral daily  gabapentin 600 milliGRAM(s) Oral five times a day  lactated ringers. 1000 milliLiter(s) (100 mL/Hr) IV Continuous <Continuous>  oxyCODONE  ER Tablet 10 milliGRAM(s) Oral every 12 hours  senna 2 Tablet(s) Oral at bedtime  sodium chloride 0.9% lock flush 3 milliLiter(s) IV Push every 8 hours  tranexamic acid IVPB 850 milliGRAM(s) IV Intermittent once  tranexamic acid IVPB 850 milliGRAM(s) IV Intermittent once    MEDICATIONS  (PRN):  acetaminophen   Tablet .. 650 milliGRAM(s) Oral every 6 hours PRN Temp greater or equal to 38C (100.4F)  aluminum hydroxide/magnesium hydroxide/simethicone Suspension 30 milliLiter(s) Oral four times a day PRN Indigestion  fentaNYL    Injectable 50 MICROGram(s) IV Push every 10 minutes PRN Moderate Pain (4 - 6)  HYDROmorphone  Injectable 0.5 milliGRAM(s) IV Push every 4 hours PRN Severe Pain (7 - 10)/breakthrough pain  magnesium hydroxide Suspension 30 milliLiter(s) Oral daily PRN Constipation  ondansetron Injectable 4 milliGRAM(s) IV Push every 6 hours PRN Nausea and/or Vomiting  oxyCODONE    IR 5 milliGRAM(s) Oral every 4 hours PRN Mild Pain (1 - 3)  oxyCODONE    IR 10 milliGRAM(s) Oral every 4 hours PRN Moderate Pain (4 - 6)      RADIOLOGY & ADDITIONAL TESTS: GARRET VALLE    [id number]    73y      Female    Frisian translation services used on phone - [employer]    CC: Rt knee pain s/p Rt TKR POD#2  knee pain is tolerable with meds  Feels light headed and dizzy when sits in chair, felt better yesterday   no other issues, no nausea    INTERVAL HPI/OVERNIGHT EVENTS: no acute events    REVIEW OF SYSTEMS:    CONSTITUTIONAL: No fever  RESPIRATORY: No cough, wheezing,; No shortness of breath  CARDIOVASCULAR: No chest pain, palpitations  GASTROINTESTINAL: . No nausea, vomiting        Vital Signs Last 24 Hrs  T(C): 37 (20 Mar 2019 05:20), Max: 37.2 (20 Mar 2019 00:21)  T(F): 98.6 (20 Mar 2019 05:20), Max: 99 (20 Mar 2019 00:21)  HR: 67 (20 Mar 2019 05:20) (67 - 73)  BP: 116/73 (20 Mar 2019 05:20) (115/67 - 122/77)  BP(mean): --  RR: 18 (20 Mar 2019 05:20) (18 - 18)  SpO2: 95% (20 Mar 2019 05:20) (94% - 95%)    PHYSICAL EXAM:    GENERAL: NAD, well-groomed  HEENT: PERRL, +EOMI  NECK: soft, Supple  CHEST/LUNG: Clear to percussion bilaterally; No wheezing  HEART: S1S2+, Regular rate and rhythm; No murmurs  EXTREMITIES:No clubbing, cyanosis, or edema  NEURO: AAOX3,         LABS:                        10.7   12.1  )-----------( 216      ( 20 Mar 2019 06:48 )             31.4   03-20    141  |  101  |  29.0<H>  ----------------------------<  134<H>  3.9   |  28.0  |  0.80    Ca    7.8<L>      20 Mar 2019 06:48                MEDICATIONS  (STANDING):  acetaminophen   Tablet .. 975 milliGRAM(s) Oral every 8 hours  acetaminophen  IVPB .. 1000 milliGRAM(s) IV Intermittent once  aspirin enteric coated 325 milliGRAM(s) Oral two times a day  BUpivacaine liposome 1.3% Injectable 20 milliLiter(s) Local Injection once  ceFAZolin   IVPB 2000 milliGRAM(s) IV Intermittent once  docusate sodium 100 milliGRAM(s) Oral three times a day  ferrous    sulfate 325 milliGRAM(s) Oral daily  gabapentin 600 milliGRAM(s) Oral five times a day  lactated ringers. 1000 milliLiter(s) (100 mL/Hr) IV Continuous <Continuous>  oxyCODONE  ER Tablet 10 milliGRAM(s) Oral every 12 hours  senna 2 Tablet(s) Oral at bedtime  sodium chloride 0.9% lock flush 3 milliLiter(s) IV Push every 8 hours  tranexamic acid IVPB 850 milliGRAM(s) IV Intermittent once  tranexamic acid IVPB 850 milliGRAM(s) IV Intermittent once    MEDICATIONS  (PRN):  acetaminophen   Tablet .. 650 milliGRAM(s) Oral every 6 hours PRN Temp greater or equal to 38C (100.4F)  aluminum hydroxide/magnesium hydroxide/simethicone Suspension 30 milliLiter(s) Oral four times a day PRN Indigestion  fentaNYL    Injectable 50 MICROGram(s) IV Push every 10 minutes PRN Moderate Pain (4 - 6)  HYDROmorphone  Injectable 0.5 milliGRAM(s) IV Push every 4 hours PRN Severe Pain (7 - 10)/breakthrough pain  magnesium hydroxide Suspension 30 milliLiter(s) Oral daily PRN Constipation  ondansetron Injectable 4 milliGRAM(s) IV Push every 6 hours PRN Nausea and/or Vomiting  oxyCODONE    IR 5 milliGRAM(s) Oral every 4 hours PRN Mild Pain (1 - 3)  oxyCODONE    IR 10 milliGRAM(s) Oral every 4 hours PRN Moderate Pain (4 - 6)      RADIOLOGY & ADDITIONAL TESTS:

## 2019-03-20 NOTE — PROGRESS NOTE ADULT - SUBJECTIVE AND OBJECTIVE BOX
GARRET HICKS    [id number]    History: 73y Female is status post right total knee arthroplasty on 3/18, POD#2.   Patient is doing well and is comfortable. The patient's pain is controlled using the prescribed pain medications. The patient is participating in physical therapy. Denies nausea, vomiting, chest pain, shortness of breath, abdominal pain or fever. No new complaints.                        10.7   12.1  )-----------( 216      ( 20 Mar 2019 06:48 )             31.4     03-20    141  |  101  |  29.0<H>  ----------------------------<  134<H>  3.9   |  28.0  |  0.80    Ca    7.8<L>      20 Mar 2019 06:48        MEDICATIONS  (STANDING):  acetaminophen   Tablet .. 975 milliGRAM(s) Oral every 8 hours  acetaminophen  IVPB .. 1000 milliGRAM(s) IV Intermittent once  amLODIPine   Tablet 5 milliGRAM(s) Oral daily  aspirin enteric coated 325 milliGRAM(s) Oral two times a day  BUpivacaine liposome 1.3% Injectable 20 milliLiter(s) Local Injection once  ceFAZolin   IVPB 2000 milliGRAM(s) IV Intermittent once  celecoxib 200 milliGRAM(s) Oral two times a day  docusate sodium 100 milliGRAM(s) Oral three times a day  ferrous    sulfate 325 milliGRAM(s) Oral daily  gabapentin 600 milliGRAM(s) Oral five times a day  hydrochlorothiazide 25 milliGRAM(s) Oral daily  lactated ringers. 1000 milliLiter(s) (100 mL/Hr) IV Continuous <Continuous>  oxyCODONE  ER Tablet 10 milliGRAM(s) Oral every 12 hours  senna 2 Tablet(s) Oral at bedtime  sodium chloride 0.9% lock flush 3 milliLiter(s) IV Push every 8 hours  tranexamic acid IVPB 850 milliGRAM(s) IV Intermittent once  tranexamic acid IVPB 850 milliGRAM(s) IV Intermittent once    MEDICATIONS  (PRN):  acetaminophen   Tablet .. 650 milliGRAM(s) Oral every 6 hours PRN Temp greater or equal to 38C (100.4F)  aluminum hydroxide/magnesium hydroxide/simethicone Suspension 30 milliLiter(s) Oral four times a day PRN Indigestion  HYDROmorphone  Injectable 0.5 milliGRAM(s) IV Push every 4 hours PRN Severe Pain (7 - 10)/breakthrough pain  magnesium hydroxide Suspension 30 milliLiter(s) Oral daily PRN Constipation  ondansetron Injectable 4 milliGRAM(s) IV Push every 6 hours PRN Nausea and/or Vomiting  oxyCODONE    IR 5 milliGRAM(s) Oral every 4 hours PRN Mild Pain (1 - 3)  oxyCODONE    IR 10 milliGRAM(s) Oral every 4 hours PRN Moderate Pain (4 - 6)      Physical exam: The right knee surgical  dressing remains  clean, dry and intact. Dressings removed and new dressings placed.  Incision cdi.   No drainage or discharge. No erythema is noted. No blistering. No ecchymosis. The calf is supple nontender. Passive range of motion is acceptable to due postoperative pain. No calf tenderness. Sensation to light touch is grossly intact distally. Motor function distally is 5/5. Extensor hallucis longus and flexor hallucis longus are intact. No foot drop. 2+ dorsalis pedis pulse. Capillary refill is less than 2 seconds. No cyanosis.    Primary Orthopedic Assessment:  • s/p RIGHT total knee replacement    Secondary  Orthopedic Assessment(s):   •     Secondary  Medical Assessment(s):   •     Plan:   • DVT prophylaxis as prescribed, including use of compression devices and ankle pumps  • Continue physical therapy  • Weightbearing as tolerated of the right lower extremity with assistance of a walker  • Incentive spirometry encouraged  • Pain control as clinically indicated  • Discharge planning – anticipated discharge is Home ONCE MEDICALLY OPTIMIZED AND CLEARED BY pt

## 2019-03-20 NOTE — PROGRESS NOTE ADULT - ASSESSMENT
73 year old female with PMH of HTN, chronic right knee pain 2/2 primary OA presents for elective Rt knee replacement. She is s/p Right total knee arthroplasty.    #Rt total knee replacement - Pain control   Bowel regimen   Incentive spirometry  DVT px - per ortho   PT    Post operative dizziness - possibly 2/2 anesthetics, narcotics, vitals stable, supportive care.     # acute blood loss anemia - Ct to monitor   # leucocytosis - Reactive - improving  # Elevated sugars- A1C - 6.3 - Pre-diabetes - educated regarding CC diet and f/u with PMD    # Hypokalemia - repleted, improved  # HTN - Ct Amlodipine and  HCTZ 73 year old female with PMH of HTN, chronic right knee pain 2/2 primary OA presents for elective Rt knee replacement. She is s/p Right total knee arthroplasty.    #Rt total knee replacement - Pain control   Bowel regimen   Incentive spirometry  DVT px - per ortho   PT    Post operative dizziness - possibly 2/2 narcotics, vitals stable (repeated in sitting position), supportive care, maintain safety.    # acute blood loss anemia - stable   # leucocytosis - Reactive - improving  # Elevated sugars- A1C - 6.3 - Pre-diabetes - educated regarding CC diet and f/u with PMD    # Hypokalemia - repleted, improved  # HTN - Ct Amlodipine and  HCTZ     Medically stable for discharge. No new changes to home medications for chronic medical illnesses recommended.

## 2019-03-20 NOTE — DISCHARGE NOTE NURSING/CASE MANAGEMENT/SOCIAL WORK - NSDCDPATPORTLINK_GEN_ALL_CORE
You can access the [facility] Patient Portal, offered by [facility], by registering with the following website: [email or web address]

## 2019-04-01 ENCOUNTER — OUTPATIENT (OUTPATIENT)
Dept: OUTPATIENT SERVICES | Facility: HOSPITAL | Age: 74
LOS: 1 days | End: 2019-04-01
Payer: MEDICARE

## 2019-04-01 DIAGNOSIS — Z96.652 PRESENCE OF LEFT ARTIFICIAL KNEE JOINT: Chronic | ICD-10-CM

## 2019-04-01 DIAGNOSIS — Z98.41 CATARACT EXTRACTION STATUS, RIGHT EYE: Chronic | ICD-10-CM

## 2019-04-16 DIAGNOSIS — Z71.89 OTHER SPECIFIED COUNSELING: ICD-10-CM

## 2019-04-17 ENCOUNTER — APPOINTMENT (OUTPATIENT)
Dept: ORTHOPEDIC SURGERY | Facility: CLINIC | Age: 74
End: 2019-04-17
Payer: MEDICAID

## 2019-04-17 DIAGNOSIS — Z47.1 AFTERCARE FOLLOWING JOINT REPLACEMENT SURGERY: ICD-10-CM

## 2019-04-17 DIAGNOSIS — Z96.651 AFTERCARE FOLLOWING JOINT REPLACEMENT SURGERY: ICD-10-CM

## 2019-04-17 PROCEDURE — 99024 POSTOP FOLLOW-UP VISIT: CPT

## 2019-04-17 NOTE — HISTORY OF PRESENT ILLNESS
[Knee Pain] : Knee Pain [___ Weeks Post Op] : [unfilled] weeks post op [Clean/Dry/Intact] : clean, dry and intact [Swelling] : swollen [Vascular Intact] : ~T peripheral vascular exam normal [Neuro Intact] : an unremarkable neurological exam [Excellent Pain Control] : has excellent pain control [Doing Well] : is doing well [No Sign of Infection] : is showing no signs of infection [Chills] : no chills [Diarrhea] : no diarrhea [Constipation] : no constipation [Dysuria] : no dysuria [Fever] : no fever [Vomiting] : no vomiting [Nausea] : no nausea [Discharge] : absent of discharge [Erythema] : not erythematous [Healed] : not healed [Dehiscence] : not dehisced [de-identified] : s/p Right TKR DOS: 03/18/19 [de-identified] : Right knee, FROM hip, well healing midline surgical scar, no effusion, 0 - 100, no crepitus, no medial pain, no lateral pain, no anterior or posterior drawer, stable, anatomic alignment, no infection, no blood clot, neurovascularly intact  [de-identified] :  No new imaging reviewed today.  [de-identified] : 73 year old female s/p Right TKR DOS: 03/18/19. She is ambulating with a cane. Pt began outpatient PT 3x/wk. Pain and swelling are improving.

## 2019-04-17 NOTE — PROCEDURE
[de-identified] : 73 year old female s/p Right TKR DOS: 03/18/19. She is doing well 4 weeks postop. She is progressing with therapy, ambulating with a cane and has good motion. Due to my leaving [facility], patient will FU in 5 weeks with Dr. Austin due to my leaving and patient preference due to location. My information was also provided if anything changes.

## 2019-06-01 PROCEDURE — G9005: CPT

## 2019-06-10 ENCOUNTER — APPOINTMENT (OUTPATIENT)
Dept: ORTHOPEDIC SURGERY | Facility: CLINIC | Age: 74
End: 2019-06-10

## 2019-06-16 ENCOUNTER — FORM ENCOUNTER (OUTPATIENT)
Age: 74
End: 2019-06-16

## 2019-10-19 ENCOUNTER — EMERGENCY (EMERGENCY)
Facility: HOSPITAL | Age: 74
LOS: 1 days | Discharge: HOME | End: 2019-10-19
Attending: EMERGENCY MEDICINE
Payer: MEDICARE

## 2019-10-19 VITALS
WEIGHT: 195.99 LBS | DIASTOLIC BLOOD PRESSURE: 82 MMHG | TEMPERATURE: 100 F | HEART RATE: 95 BPM | HEIGHT: 66 IN | RESPIRATION RATE: 18 BRPM | OXYGEN SATURATION: 95 % | SYSTOLIC BLOOD PRESSURE: 145 MMHG

## 2019-10-19 DIAGNOSIS — Z96.652 PRESENCE OF LEFT ARTIFICIAL KNEE JOINT: Chronic | ICD-10-CM

## 2019-10-19 DIAGNOSIS — Z98.41 CATARACT EXTRACTION STATUS, RIGHT EYE: Chronic | ICD-10-CM

## 2019-10-19 PROCEDURE — 99284 EMERGENCY DEPT VISIT MOD MDM: CPT | Mod: 25

## 2019-10-19 PROCEDURE — 99284 EMERGENCY DEPT VISIT MOD MDM: CPT

## 2019-10-19 PROCEDURE — T1013: CPT

## 2019-10-19 PROCEDURE — 96374 THER/PROPH/DIAG INJ IV PUSH: CPT

## 2019-10-19 RX ORDER — FAMOTIDINE 10 MG/ML
20 INJECTION INTRAVENOUS DAILY
Refills: 0 | Status: DISCONTINUED | OUTPATIENT
Start: 2019-10-19 | End: 2019-10-25

## 2019-10-19 RX ORDER — DIPHENHYDRAMINE HCL 50 MG
50 CAPSULE ORAL ONCE
Refills: 0 | Status: COMPLETED | OUTPATIENT
Start: 2019-10-19 | End: 2019-10-19

## 2019-10-19 RX ADMIN — FAMOTIDINE 20 MILLIGRAM(S): 10 INJECTION INTRAVENOUS at 23:05

## 2019-10-19 RX ADMIN — Medication 50 MILLIGRAM(S): at 23:05

## 2019-10-19 RX ADMIN — Medication 60 MILLIGRAM(S): at 23:04

## 2019-10-19 NOTE — ED STATDOCS - PATIENT PORTAL LINK FT
You can access the FollowMyHealth Patient Portal offered by [facility] by registering at the following website: [email or web address]. By joining [employer]’s FollowMyHealth portal, you will also be able to view your health information using other applications (apps) compatible with our system.

## 2019-10-19 NOTE — ED STATDOCS - NS_ ATTENDINGSCRIBEDETAILS _ED_A_ED_FT
I, Jean Pierre Lane, performed the initial face to face bedside interview with this patient regarding history of present illness, review of symptoms and relevant past medical, social and family history.  I completed an independent physical examination.  I was the initial provider who evaluated this patient. I have signed out the follow up of any pending tests (i.e. labs, radiological studies) to the ACP.  I have communicated the patient’s plan of care and disposition with the ACP.  The history, relevant review of systems, past medical and surgical history, medical decision making, and physical examination was documented by the scribe in my presence and I attest to the accuracy of the documentation.

## 2019-10-19 NOTE — ED STATDOCS - OBJECTIVE STATEMENT
75 y/o F pt with hx of HTN presents to ED c/o diffuse itching to her arms, face and trunk since 22:00 last night. Did not eat any strange foods. No new medications, soaps or detergent. No difficulty breathing or swallowing. Did not take Benadryl PTA. No further complaints at this time.

## 2019-10-19 NOTE — ED STATDOCS - PROGRESS NOTE DETAILS
PA NOTE: Pt with improvement in symptoms s/p treatment. Will treat with course of PO pepcid / prednisone. Pt advised to return to ED if she develops worsening rash, SOB, vomiting.

## 2019-10-20 RX ORDER — FAMOTIDINE 10 MG/ML
1 INJECTION INTRAVENOUS
Qty: 5 | Refills: 0
Start: 2019-10-20 | End: 2019-10-24

## 2019-11-13 ENCOUNTER — APPOINTMENT (OUTPATIENT)
Dept: GASTROENTEROLOGY | Facility: CLINIC | Age: 74
End: 2019-11-13
Payer: MEDICARE

## 2019-11-13 VITALS
BODY MASS INDEX: 35.87 KG/M2 | HEART RATE: 88 BPM | SYSTOLIC BLOOD PRESSURE: 132 MMHG | OXYGEN SATURATION: 98 % | WEIGHT: 190 LBS | HEIGHT: 61 IN | RESPIRATION RATE: 16 BRPM | DIASTOLIC BLOOD PRESSURE: 84 MMHG

## 2019-11-13 DIAGNOSIS — Z87.898 PERSONAL HISTORY OF OTHER SPECIFIED CONDITIONS: ICD-10-CM

## 2019-11-13 PROCEDURE — 99214 OFFICE O/P EST MOD 30 MIN: CPT

## 2019-11-13 RX ORDER — OXYCODONE 5 MG/1
5 TABLET ORAL
Qty: 50 | Refills: 0 | Status: DISCONTINUED | COMMUNITY
Start: 2019-03-27 | End: 2019-11-13

## 2019-11-13 RX ORDER — ONDANSETRON 4 MG/1
4 TABLET, ORALLY DISINTEGRATING ORAL
Qty: 30 | Refills: 0 | Status: DISCONTINUED | COMMUNITY
Start: 2019-03-22 | End: 2019-11-13

## 2019-11-13 RX ORDER — OXYCODONE 5 MG/1
5 TABLET ORAL
Qty: 50 | Refills: 0 | Status: DISCONTINUED | COMMUNITY
Start: 2019-04-17 | End: 2019-11-13

## 2019-11-13 NOTE — HISTORY OF PRESENT ILLNESS
[de-identified] : This is a alberto 74-year-old [place]araguan-speaking woman with a past medical history of hypertension who was initially referred by her primary care doctor for evaluation for a screening colonoscopy.  The patient has never undergone a colonoscopy in the past, although she reports having undergone an EGD more than 20 years ago.  She recently had some diffuse abdominal pain, but that has since resolved.  She failed to schedule her colonoscopy at her last visit with me.

## 2019-11-13 NOTE — REASON FOR VISIT
[Follow-Up: _____] : a [unfilled] follow-up visit [[employer] ] : provided by [employer]   [FreeTextEntry1] : [id number] [FreeTextEntry2] : Cedric [TWNoteComboBox1] : Citizen of [place]

## 2019-11-13 NOTE — CONSULT LETTER
[Dear  ___] : Dear  [unfilled], [Consult Letter:] : I had the pleasure of evaluating your patient, [unfilled]. [Please see my note below.] : Please see my note below. [Consult Closing:] : Thank you very much for allowing me to participate in the care of this patient.  If you have any questions, please do not hesitate to contact me. [FreeTextEntry3] : Very truly yours,\par \par REY Meza MD\par [facility]\par Gastroenterology [facility] [place]\par [address], [address]\[place] [address]\par Tel [phone]\par Fax [phone]

## 2019-11-13 NOTE — PHYSICAL EXAM
[General Appearance - Alert] : alert [General Appearance - In No Acute Distress] : in no acute distress [Sclera] : the sclera and conjunctiva were normal [PERRL With Normal Accommodation] : pupils were equal in size, round, and reactive to light [Extraocular Movements] : extraocular movements were intact [Outer Ear] : the ears and nose were normal in appearance [Oropharynx] : the oropharynx was normal [Neck Appearance] : the appearance of the neck was normal [Neck Cervical Mass (___cm)] : no neck mass was observed [Jugular Venous Distention Increased] : there was no jugular-venous distention [Thyroid Diffuse Enlargement] : the thyroid was not enlarged [Thyroid Nodule] : there were no palpable thyroid nodules [Auscultation Breath Sounds / Voice Sounds] : lungs were clear to auscultation bilaterally [Heart Rate And Rhythm] : heart rate was normal and rhythm regular [Heart Sounds] : normal S1 and S2 [Heart Sounds Gallop] : no gallops [Murmurs] : no murmurs [Heart Sounds Pericardial Friction Rub] : no pericardial rub [Edema] : there was no peripheral edema [Bowel Sounds] : normal bowel sounds [Abdomen Soft] : soft [Abdomen Tenderness] : non-tender [] : no hepato-splenomegaly [Abdomen Mass (___ Cm)] : no abdominal mass palpated [Cervical Lymph Nodes Enlarged Posterior Bilaterally] : posterior cervical [Cervical Lymph Nodes Enlarged Anterior Bilaterally] : anterior cervical [Supraclavicular Lymph Nodes Enlarged Bilaterally] : supraclavicular [Nail Clubbing] : no clubbing  or cyanosis of the fingernails [No Focal Deficits] : no focal deficits [Oriented To Time, Place, And Person] : oriented to person, place, and time [Impaired Insight] : insight and judgment were intact [Affect] : the affect was normal

## 2020-02-06 ENCOUNTER — OUTPATIENT (OUTPATIENT)
Dept: OUTPATIENT SERVICES | Facility: HOSPITAL | Age: 75
LOS: 1 days | End: 2020-02-06
Payer: MEDICARE

## 2020-02-06 ENCOUNTER — APPOINTMENT (OUTPATIENT)
Dept: GASTROENTEROLOGY | Facility: GI CENTER | Age: 75
End: 2020-02-06
Payer: MEDICARE

## 2020-02-06 DIAGNOSIS — Z98.41 CATARACT EXTRACTION STATUS, RIGHT EYE: Chronic | ICD-10-CM

## 2020-02-06 DIAGNOSIS — K57.30 DIVERTICULOSIS OF LARGE INTESTINE W/OUT PERFORATION OR ABSCESS W/OUT BLEEDING: ICD-10-CM

## 2020-02-06 DIAGNOSIS — Z96.652 PRESENCE OF LEFT ARTIFICIAL KNEE JOINT: Chronic | ICD-10-CM

## 2020-02-06 DIAGNOSIS — Z12.11 ENCOUNTER FOR SCREENING FOR MALIGNANT NEOPLASM OF COLON: ICD-10-CM

## 2020-02-06 DIAGNOSIS — K64.4 RESIDUAL HEMORRHOIDAL SKIN TAGS: ICD-10-CM

## 2020-02-06 LAB — GLUCOSE BLDC GLUCOMTR-MCNC: 112 MG/DL — HIGH (ref 70–99)

## 2020-02-06 PROCEDURE — 45378 DIAGNOSTIC COLONOSCOPY: CPT

## 2020-02-06 PROCEDURE — G0121: CPT

## 2020-02-06 PROCEDURE — 82962 GLUCOSE BLOOD TEST: CPT

## 2020-02-06 RX ORDER — POLYETHYLENE GLYOCOL 3350, SODIUM CHLORIDE, SODIUM BICARBONATE AND POTASSIUM CHLORIDE 420; 11.2; 5.72; 1.48 G/4L; G/4L; G/4L; G/4L
420 POWDER, FOR SOLUTION NASOGASTRIC; ORAL
Qty: 1 | Refills: 0 | Status: COMPLETED | COMMUNITY
Start: 2018-12-04 | End: 2020-02-06

## 2020-02-06 RX ORDER — POLYETHYLENE GLYOCOL 3350, SODIUM CHLORIDE, SODIUM BICARBONATE AND POTASSIUM CHLORIDE 420; 11.2; 5.72; 1.48 G/4L; G/4L; G/4L; G/4L
420 POWDER, FOR SOLUTION NASOGASTRIC; ORAL
Qty: 1 | Refills: 0 | Status: COMPLETED | COMMUNITY
Start: 2019-11-13 | End: 2020-02-06

## 2020-02-06 NOTE — PHYSICAL EXAM
[General Appearance - Alert] : alert [General Appearance - In No Acute Distress] : in no acute distress [Sclera] : the sclera and conjunctiva were normal [PERRL With Normal Accommodation] : pupils were equal in size, round, and reactive to light [Extraocular Movements] : extraocular movements were intact [Oropharynx] : the oropharynx was normal [Outer Ear] : the ears and nose were normal in appearance [Neck Appearance] : the appearance of the neck was normal [Jugular Venous Distention Increased] : there was no jugular-venous distention [Neck Cervical Mass (___cm)] : no neck mass was observed [Thyroid Diffuse Enlargement] : the thyroid was not enlarged [Thyroid Nodule] : there were no palpable thyroid nodules [Auscultation Breath Sounds / Voice Sounds] : lungs were clear to auscultation bilaterally [Heart Rate And Rhythm] : heart rate was normal and rhythm regular [Heart Sounds] : normal S1 and S2 [Heart Sounds Gallop] : no gallops [Murmurs] : no murmurs [Heart Sounds Pericardial Friction Rub] : no pericardial rub [Edema] : there was no peripheral edema [Abdomen Soft] : soft [Bowel Sounds] : normal bowel sounds [] : no hepato-splenomegaly [Abdomen Tenderness] : non-tender [Abdomen Mass (___ Cm)] : no abdominal mass palpated [Cervical Lymph Nodes Enlarged Posterior Bilaterally] : posterior cervical [Supraclavicular Lymph Nodes Enlarged Bilaterally] : supraclavicular [Cervical Lymph Nodes Enlarged Anterior Bilaterally] : anterior cervical [Nail Clubbing] : no clubbing  or cyanosis of the fingernails [No Focal Deficits] : no focal deficits [Oriented To Time, Place, And Person] : oriented to person, place, and time [Impaired Insight] : insight and judgment were intact [Affect] : the affect was normal

## 2020-02-06 NOTE — PROCEDURE
[Colon Cancer Screening] : colon cancer screening [Procedure Explained] : The procedure was explained [Allergies Reviewed] : allergies reviewed. [Risks] : Risks [Benefits] : benefits [Infection] : risk of infection [Alternatives] : alternatives [Bleeding] : bleeding risk [Consent Obtained] : written consent was obtained prior to the procedure and is detailed in the patient's record [Patient] : the patient [Approved Diet Followed] : the patient avoided solid foods and adhered to the approved diet list for 24 hours prior to the procedure [Bowel Prep Kit] : the patient took the appropriate bowel preparation kit as directed [Automated Blood Pressure Cuff] : automated blood pressure cuff [Pulse Oximeter] : pulse oximeter [Cardiac Monitor] : cardiac monitor [Propofol ___ mg IV] : Propofol [unfilled] ~Umg intravenously [2] : 2 [Prep Qualtiy: ___] : Prep Quality:  [unfilled] [Sedation Clearance] : the patient was cleared for moderate sedation [Withdrawal Time: ___] : Withdrawal Time:  [unfilled] [Performed By: ___] : Performed by:  JUAN [External Hemorrhoids] : external hemorrhoids [Cecum (Landmarks)] : and guided to the cecum which was identified by the anatomic landmarks of the appendiceal orifice and ileocecal valve [Minimal Difficulty] : with minimal difficulty [Insufflated] : insufflated [Single Pass Needed] : after a single pass [Retroflex View] : a retroflex view of the rectum was performed [Normal] : Normal [Diverticulosis] : diverticulosis [Hemorrhoids] : hemorrhoids [Tolerated Well] : the patient tolerated the procedure well [Vital Signs Stable] : the vital signs were stable [No Complications] : There were no complications [Abnormal Rectum] : a normal rectum [Patient Rotated Into Alternating Positions] : the patient was not rotated [FreeTextEntry2] : [employer] [id number]

## 2020-02-06 NOTE — ASSESSMENT
[FreeTextEntry1] : Exam notable only for a few scattered sigmoid diverticula and small external hemorrhoids.  No further colonoscopy necessary due to age.  Repeat exam could be considered in 10 years if clinically indicated.

## 2020-02-06 NOTE — PROCEDURE
[Colon Cancer Screening] : colon cancer screening [Allergies Reviewed] : allergies reviewed. [Procedure Explained] : The procedure was explained [Benefits] : benefits [Risks] : Risks [Alternatives] : alternatives [Bleeding] : bleeding risk [Infection] : risk of infection [Consent Obtained] : written consent was obtained prior to the procedure and is detailed in the patient's record [Patient] : the patient [Bowel Prep Kit] : the patient took the appropriate bowel preparation kit as directed [Approved Diet Followed] : the patient avoided solid foods and adhered to the approved diet list for 24 hours prior to the procedure [Automated Blood Pressure Cuff] : automated blood pressure cuff [Propofol ___ mg IV] : Propofol [unfilled] ~Umg intravenously [Cardiac Monitor] : cardiac monitor [Pulse Oximeter] : pulse oximeter [2] : 2 [Prep Qualtiy: ___] : Prep Quality:  [unfilled] [Sedation Clearance] : the patient was cleared for moderate sedation [Withdrawal Time: ___] : Withdrawal Time:  [unfilled] [Performed By: ___] : Performed by:  JUAN [Cecum (Landmarks)] : and guided to the cecum which was identified by the anatomic landmarks of the appendiceal orifice and ileocecal valve [External Hemorrhoids] : external hemorrhoids [Single Pass Needed] : after a single pass [Minimal Difficulty] : with minimal difficulty [Insufflated] : insufflated [Retroflex View] : a retroflex view of the rectum was performed [Normal] : Normal [Hemorrhoids] : hemorrhoids [Diverticulosis] : diverticulosis [Tolerated Well] : the patient tolerated the procedure well [Vital Signs Stable] : the vital signs were stable [No Complications] : There were no complications [Abnormal Rectum] : a normal rectum [Patient Rotated Into Alternating Positions] : the patient was not rotated [FreeTextEntry2] : [employer] [id number]

## 2020-02-06 NOTE — PHYSICAL EXAM
[General Appearance - Alert] : alert [General Appearance - In No Acute Distress] : in no acute distress [Sclera] : the sclera and conjunctiva were normal [PERRL With Normal Accommodation] : pupils were equal in size, round, and reactive to light [Extraocular Movements] : extraocular movements were intact [Oropharynx] : the oropharynx was normal [Outer Ear] : the ears and nose were normal in appearance [Neck Appearance] : the appearance of the neck was normal [Jugular Venous Distention Increased] : there was no jugular-venous distention [Neck Cervical Mass (___cm)] : no neck mass was observed [Thyroid Nodule] : there were no palpable thyroid nodules [Thyroid Diffuse Enlargement] : the thyroid was not enlarged [Auscultation Breath Sounds / Voice Sounds] : lungs were clear to auscultation bilaterally [Heart Rate And Rhythm] : heart rate was normal and rhythm regular [Heart Sounds Gallop] : no gallops [Heart Sounds] : normal S1 and S2 [Heart Sounds Pericardial Friction Rub] : no pericardial rub [Murmurs] : no murmurs [Edema] : there was no peripheral edema [Bowel Sounds] : normal bowel sounds [Abdomen Soft] : soft [Abdomen Tenderness] : non-tender [] : no hepato-splenomegaly [Abdomen Mass (___ Cm)] : no abdominal mass palpated [Cervical Lymph Nodes Enlarged Posterior Bilaterally] : posterior cervical [Cervical Lymph Nodes Enlarged Anterior Bilaterally] : anterior cervical [Supraclavicular Lymph Nodes Enlarged Bilaterally] : supraclavicular [Nail Clubbing] : no clubbing  or cyanosis of the fingernails [No Focal Deficits] : no focal deficits [Oriented To Time, Place, And Person] : oriented to person, place, and time [Impaired Insight] : insight and judgment were intact [Affect] : the affect was normal

## 2020-10-08 ENCOUNTER — APPOINTMENT (OUTPATIENT)
Dept: CARDIOLOGY | Facility: CLINIC | Age: 75
End: 2020-10-08
Payer: MEDICARE

## 2020-10-08 VITALS
WEIGHT: 193 LBS | TEMPERATURE: 97.9 F | HEIGHT: 61 IN | DIASTOLIC BLOOD PRESSURE: 78 MMHG | SYSTOLIC BLOOD PRESSURE: 136 MMHG | HEART RATE: 81 BPM | BODY MASS INDEX: 36.44 KG/M2 | RESPIRATION RATE: 16 BRPM

## 2020-10-08 DIAGNOSIS — E78.1 PURE HYPERGLYCERIDEMIA: ICD-10-CM

## 2020-10-08 PROCEDURE — 99214 OFFICE O/P EST MOD 30 MIN: CPT

## 2020-10-08 PROCEDURE — 93000 ELECTROCARDIOGRAM COMPLETE: CPT

## 2020-10-08 RX ORDER — VALSARTAN 160 MG/1
160 TABLET, COATED ORAL
Refills: 0 | Status: COMPLETED | COMMUNITY
End: 2020-10-08

## 2020-10-09 NOTE — HISTORY OF PRESENT ILLNESS
[FreeTextEntry1] : From a cardiac standpoint, Mrs. Dyer denies exertional chest pain, shortness of breath, palpitations, lightheadedness, or syncope.  She states she remains reasonably active.

## 2020-10-09 NOTE — ASSESSMENT
[FreeTextEntry1] : 1.  EKG today reveals normal sinus rhythm at 81 bpm.  Low voltage QRS.  No ischemic changes.  \par \par 2.  Hypertension:  Blood pressure appears to be well controlled at this time on current medications.  Patient remains obese and advised to lose weight.  A low-salt diet was discussed as well. \par \par 3.  Hyperlipidemia:  Patient recently underwent fasting bloodwork through her PCP’s office which revealed a total cholesterol of 144, HDL 42, TC/HDL ratio 3.4, LDL 76, triglycerides 160.  Patient advised on a low-fat / low-cholesterol diet and continuation of current statin therapy. \par \par 4.  Non-insulin dependent diabetes mellitus:  Recent bloodwork also reveals a fasting glucose of 119 with a hemoglobin A1C of 6.1.  Will renew patient’s Metformin therapy at this point as she does not have an appointment with her PCP for another two weeks and she has recently run out of medication. \par \par 5.  Aortic stenosis:  Patient clinically stable without chest pain, shortness of breath, or syncope.  Does have a murmur of aortic stenosis with radiation to her neck.  Will obtain carotid duplex as well since I cannot appreciated a carotid bruit given the aortic stenosis radiation.  Will call patient with results and if stable, to follow up within 12 months.     \par

## 2020-10-09 NOTE — REASON FOR VISIT
[FreeTextEntry1] : Mr. Dyer is a pleasant 75-year-old  female, native of [facility] with a past medical history significant for hypertension, hyperlipidemia, diabetes mellitus, and aortic stenosis, who presents for follow up evaluation.  \par \par \par

## 2020-10-15 ENCOUNTER — APPOINTMENT (OUTPATIENT)
Dept: CARDIOLOGY | Facility: CLINIC | Age: 75
End: 2020-10-15
Payer: MEDICARE

## 2020-10-15 PROCEDURE — 93306 TTE W/DOPPLER COMPLETE: CPT

## 2020-11-04 ENCOUNTER — APPOINTMENT (OUTPATIENT)
Dept: CARDIOLOGY | Facility: CLINIC | Age: 75
End: 2020-11-04
Payer: MEDICARE

## 2020-11-04 PROCEDURE — 93880 EXTRACRANIAL BILAT STUDY: CPT

## 2020-11-04 PROCEDURE — 99072 ADDL SUPL MATRL&STAF TM PHE: CPT

## 2020-11-24 NOTE — DISCHARGE NOTE PROVIDER - CARE PROVIDERS DIRECT ADDRESSES
improved
[email or web address]

## 2020-11-27 ENCOUNTER — OUTPATIENT (OUTPATIENT)
Dept: OUTPATIENT SERVICES | Facility: HOSPITAL | Age: 75
LOS: 1 days | End: 2020-11-27
Payer: MEDICARE

## 2020-11-27 DIAGNOSIS — Z96.652 PRESENCE OF LEFT ARTIFICIAL KNEE JOINT: Chronic | ICD-10-CM

## 2020-11-27 DIAGNOSIS — Z98.41 CATARACT EXTRACTION STATUS, RIGHT EYE: Chronic | ICD-10-CM

## 2020-11-27 DIAGNOSIS — Z11.59 ENCOUNTER FOR SCREENING FOR OTHER VIRAL DISEASES: ICD-10-CM

## 2020-11-27 LAB — SARS-COV-2 RNA SPEC QL NAA+PROBE: SIGNIFICANT CHANGE UP

## 2020-11-27 PROCEDURE — U0003: CPT

## 2020-11-28 DIAGNOSIS — Z11.59 ENCOUNTER FOR SCREENING FOR OTHER VIRAL DISEASES: ICD-10-CM

## 2020-12-23 PROBLEM — Z12.11 ENCOUNTER FOR SCREENING COLONOSCOPY: Status: RESOLVED | Noted: 2018-12-04 | Resolved: 2020-12-23

## 2021-07-02 ENCOUNTER — NON-APPOINTMENT (OUTPATIENT)
Age: 76
End: 2021-07-02

## 2021-07-02 ENCOUNTER — APPOINTMENT (OUTPATIENT)
Dept: CARDIOLOGY | Facility: CLINIC | Age: 76
End: 2021-07-02
Payer: MEDICARE

## 2021-07-02 VITALS
DIASTOLIC BLOOD PRESSURE: 70 MMHG | HEART RATE: 76 BPM | SYSTOLIC BLOOD PRESSURE: 140 MMHG | BODY MASS INDEX: 36.63 KG/M2 | WEIGHT: 194 LBS | HEIGHT: 61 IN | OXYGEN SATURATION: 97 % | RESPIRATION RATE: 16 BRPM

## 2021-07-02 DIAGNOSIS — R09.89 OTHER SPECIFIED SYMPTOMS AND SIGNS INVOLVING THE CIRCULATORY AND RESPIRATORY SYSTEMS: ICD-10-CM

## 2021-07-02 PROCEDURE — 93000 ELECTROCARDIOGRAM COMPLETE: CPT

## 2021-07-02 PROCEDURE — 99214 OFFICE O/P EST MOD 30 MIN: CPT

## 2021-07-02 RX ORDER — ZOLPIDEM TARTRATE 5 MG/1
5 TABLET, FILM COATED ORAL AT BEDTIME
Refills: 0 | Status: DISCONTINUED | COMMUNITY
End: 2021-07-02

## 2021-07-02 RX ORDER — AMLODIPINE BESYLATE 10 MG/1
10 TABLET ORAL DAILY
Refills: 0 | Status: DISCONTINUED | COMMUNITY
End: 2021-07-02

## 2021-07-02 RX ORDER — ACETAMINOPHEN 500 MG/1
500 TABLET ORAL
Refills: 0 | Status: DISCONTINUED | COMMUNITY
End: 2021-07-02

## 2021-07-02 RX ORDER — GABAPENTIN 100 MG/1
100 CAPSULE ORAL AT BEDTIME
Refills: 0 | Status: DISCONTINUED | COMMUNITY
End: 2021-07-02

## 2021-07-02 RX ORDER — SIMVASTATIN 5 MG/1
5 TABLET, FILM COATED ORAL
Refills: 0 | Status: DISCONTINUED | COMMUNITY
End: 2021-07-02

## 2021-07-02 NOTE — REASON FOR VISIT
[FreeTextEntry1] : The patient is a pleasant 75-year-old  female, native of [facility], with a past medical history significant for hypertension, hyperlipidemia, diabetes mellitus, and aortic stenosis, who presents for follow up evaluation.

## 2021-07-02 NOTE — HISTORY OF PRESENT ILLNESS
[FreeTextEntry1] : Mrs. Angie Green presents today without complaints of chest pain, palpitations, lightheadedness or syncope.  She has been experiencing occasional, mild shortness of breath with and without exertion.  Upon reviewing her medications, it appears that changes have been made by her PCP.  At this time, she states that she is only taking aspirin, metformin and losartan.

## 2021-07-02 NOTE — CARDIOLOGY SUMMARY
[de-identified] : Sinus rhythm at 76 bpm.  Poor R-wave progression in leads V1-V3.  No acute ischemic changes.

## 2021-07-02 NOTE — DISCUSSION/SUMMARY
[FreeTextEntry1] : 1 - Hypertension:   Blood pressure borderline controlled on current medications.  Presently on losartan 50mg daily as her "PCP discontinued amlodipine for leg swelling."  Will have Mrs. Ordoñez restart HCTZ 12.5mg daily as she has not been taking this either.   This should help with both her pressure and her trace bilateral LE edema.  Advised to follow strict low sodium diet and weight loss\par \par 2 - Hyperlipidemia:  cholesterol 182, HDL 37, triglycerides 167, , TC/HDL 4.9.  Follow low fat, low cholesterol, low carbohydrate diet.\par \par 3 - Aortic stenosis: Clinically stable at this time, however, states that she has occasional, mild shortness of breath with and without exertion.\par Echocardiogram (10/15/2020):  EF 55-60%.  Mildly dilated left atrium.  There is mild dilatation of the ascending aorta.  Mild aortic valve stenosis.  Mild to moderate aortic regurgitation.  Mild mitral valve regurgitation.  Mild tricuspid regurgitation.\par \par 4 - Carotid duplex (11/4/2020):  Normal bilateral carotid duplex ultrasound exam.\par \par 5 - Recent blood work through PCP office (5/26/2021):  glucose 119, BUN 17, creatinine 0.73, potassium 4.5, HgA1c 5.9, TSH 2.26, H/H 13.6/40.8, platelets 278, vitamin D 30.\par \par 6 - Follow up with Dr. Byrne in 6 weeks.

## 2021-07-02 NOTE — PHYSICAL EXAM
[Well Developed] : well developed [Well Nourished] : well nourished [No Acute Distress] : no acute distress [Obese] : obese [Normal Conjunctiva] : normal conjunctiva [Normal Venous Pressure] : normal venous pressure [Normal S1, S2] : normal S1, S2 [No Rub] : no rub [S4] : S4 [Clear Lung Fields] : clear lung fields [No Respiratory Distress] : no respiratory distress  [Soft] : abdomen soft [Normal Bowel Sounds] : normal bowel sounds [Normal Gait] : normal gait [No Rash] : no rash [No Skin Lesions] : no skin lesions [Moves all extremities] : moves all extremities [No Focal Deficits] : no focal deficits [Normal Speech] : normal speech [Alert and Oriented] : alert and oriented [Normal memory] : normal memory [de-identified] : AS murmur noted [de-identified] : I-II/VI systolic murmur [de-identified] : Trace bilateral LE edema

## 2021-09-01 ENCOUNTER — APPOINTMENT (OUTPATIENT)
Dept: CARDIOLOGY | Facility: CLINIC | Age: 76
End: 2021-09-01
Payer: MEDICARE

## 2021-09-01 VITALS
HEART RATE: 75 BPM | RESPIRATION RATE: 16 BRPM | WEIGHT: 194 LBS | HEIGHT: 61 IN | SYSTOLIC BLOOD PRESSURE: 146 MMHG | DIASTOLIC BLOOD PRESSURE: 80 MMHG | BODY MASS INDEX: 36.63 KG/M2

## 2021-09-01 DIAGNOSIS — R60.9 EDEMA, UNSPECIFIED: ICD-10-CM

## 2021-09-01 DIAGNOSIS — M17.0 BILATERAL PRIMARY OSTEOARTHRITIS OF KNEE: ICD-10-CM

## 2021-09-01 DIAGNOSIS — R79.89 OTHER SPECIFIED ABNORMAL FINDINGS OF BLOOD CHEMISTRY: ICD-10-CM

## 2021-09-01 PROCEDURE — 93000 ELECTROCARDIOGRAM COMPLETE: CPT

## 2021-09-01 PROCEDURE — 99214 OFFICE O/P EST MOD 30 MIN: CPT

## 2021-09-03 NOTE — REASON FOR VISIT
[FreeTextEntry1] : Mrs. Dyer is a pleasant 76-year-old  female, native of [facility], with a past medical history significant for hypertension, hyperlipidemia, diabetes mellitus, aortic stenosis, and osteoarthritis, who presents for follow up evaluation.  \par \par

## 2021-09-03 NOTE — ASSESSMENT
[FreeTextEntry1] : 1.  EKG today reveals sinus rhythm at 75 bpm.  Occasional PVCs.  No acute ischemic changes.   \par \par 2.  Hypertension:  Blood pressure still borderline at this time.  Patient is advised on a stricter low-salt diet and weight loss.  She is to continue all current medications. \par \par 3.  Hyperlipidemia:  Recent lipid profile reveals a total cholesterol of 201, HDL 40, , triglycerides 210.  This is noted along with a fasting glucose of 112 and a hemoglobin A1C of 5.8.  Patient advised on a strict low-fat / low-cholesterol diet and a low-carbohydrate diet as well.  \par \par 4.  Mild shortness of breath and peripheral edema:  Both have improved significantly with low-dose diuretic therapy.  I recommend the patient will continue with this.  She plans to travel to [facility].  She was advised on a strict low-salt diet there.  She will undergo follow up here in December as well as follow up bloodwork. \par \par 5.  Hypovitaminosis D:  Patient with a vitamin D level of 20.  Will be started on vitamin D3 5000 units daily.  \par

## 2021-09-03 NOTE — HISTORY OF PRESENT ILLNESS
[FreeTextEntry1] : Mrs. Dyer was seen in the office on July 2nd with regular complaints of mild shortness of breath and noted mild peripheral edema on exam.  She was started on low-dose HCTZ and presents for a follow up.

## 2021-10-13 NOTE — H&P PST ADULT - PROBLEM SELECTOR PROBLEM 1
pt presents to the ED c/o irritation/burning to bilateral eyes x 2 hours with +tears/drainage. Pt states he does not remember getting anything in them. States he put lubricating eyedrops in both eyes which made it worse. Denies blurred vision/visual loss. Pt's daughter is being treated for pink eye. Osteoarthritis of left knee

## 2022-05-10 NOTE — PHYSICAL THERAPY INITIAL EVALUATION ADULT - PERSONAL SAFETY AND JUDGMENT, REHAB EVAL
Scheduled colonosocpy on 5.10.2022 / [facility] [place]  / miralax instructions given to patient 4.18.2022 / patient is going to go to [facility] for COVID test and bring in negative result     intact

## 2022-06-24 ENCOUNTER — APPOINTMENT (OUTPATIENT)
Dept: CARDIOLOGY | Facility: CLINIC | Age: 77
End: 2022-06-24
Payer: MEDICARE

## 2022-06-24 VITALS
OXYGEN SATURATION: 97 % | HEART RATE: 69 BPM | BODY MASS INDEX: 37 KG/M2 | RESPIRATION RATE: 16 BRPM | WEIGHT: 196 LBS | DIASTOLIC BLOOD PRESSURE: 70 MMHG | HEIGHT: 61 IN | SYSTOLIC BLOOD PRESSURE: 150 MMHG

## 2022-06-24 PROCEDURE — 99214 OFFICE O/P EST MOD 30 MIN: CPT

## 2022-06-24 PROCEDURE — 93000 ELECTROCARDIOGRAM COMPLETE: CPT

## 2022-06-27 NOTE — ASSESSMENT
[FreeTextEntry1] : 1.  EKG today reveals normal sinus rhythm at 69 bpm.  Normal intervals.  No evidence of ischemia. \par \par 2.  Hypertension:  Blood pressure suboptimally controlled at this time.  Patient advised on a low-salt diet and weight loss.  Will increase Losartan to 50 mg b.i.d.  Patient to continue with diuretic as well.  \par \par 3.  Hyperlipidemia:  Review of recent bloodwork performed through her PCP’s office revealed a total cholesterol of 184, HDL 38, , triglycerides 128, TC/HDL ratio 5.  Patient advised on a stricter low-fat / low-cholesterol diet and regular aerobic exercise.  \par \par 4.  Valvular heart disease:  Patient with known mild to moderate aortic stenosis in the past.  Clinically asymptomatic at this time.  Will undergo follow up echocardiography for further assessment. \par \par 5.  Right shoulder pain:  Patient with pinpoint pain involving her right shoulder with radiation down her arm.  I have advised her on ibuprofen 400 mg t.i.d. following each meal.  She will also follow up with her PCP and see an orthopedic surgeon for further evaluation.  If clinically stable from a cardiac standpoint, office visit four weeks.   \par   \par

## 2022-06-27 NOTE — REASON FOR VISIT
[FreeTextEntry1] : Mrs. Dyer is a pleasant 76-year-old  female, native of [facility], with a past medical history significant for hypertension, hyperlipidemia, diabetes mellitus, and aortic stenosis, who presents for follow up evaluation.  \par \par

## 2022-06-27 NOTE — HISTORY OF PRESENT ILLNESS
[FreeTextEntry1] : From a cardiac standpoint, Mrs. Dyer denies exertional chest pain, shortness of breath, palpitations, lightheadedness, and syncope.  She does have right shoulder pain radiating down her arm. \par

## 2022-07-18 ENCOUNTER — APPOINTMENT (OUTPATIENT)
Dept: CARDIOLOGY | Facility: CLINIC | Age: 77
End: 2022-07-18

## 2022-07-18 PROCEDURE — 93306 TTE W/DOPPLER COMPLETE: CPT

## 2022-08-03 ENCOUNTER — NON-APPOINTMENT (OUTPATIENT)
Age: 77
End: 2022-08-03

## 2022-08-03 ENCOUNTER — APPOINTMENT (OUTPATIENT)
Dept: CARDIOLOGY | Facility: CLINIC | Age: 77
End: 2022-08-03

## 2022-08-03 VITALS
HEART RATE: 73 BPM | HEIGHT: 61 IN | WEIGHT: 198 LBS | SYSTOLIC BLOOD PRESSURE: 124 MMHG | RESPIRATION RATE: 16 BRPM | BODY MASS INDEX: 37.38 KG/M2 | DIASTOLIC BLOOD PRESSURE: 68 MMHG

## 2022-08-03 DIAGNOSIS — M25.511 PAIN IN RIGHT SHOULDER: ICD-10-CM

## 2022-08-03 PROCEDURE — 93000 ELECTROCARDIOGRAM COMPLETE: CPT

## 2022-08-03 PROCEDURE — 99214 OFFICE O/P EST MOD 30 MIN: CPT | Mod: 25

## 2022-08-03 NOTE — REASON FOR VISIT
[FreeTextEntry1] : Mrs. Dyer is a pleasant 76-year-old  female, native of [facility], with a past medical history significant for hypertension, hyperlipidemia, diabetes mellitus, and aortic stenosis, who presents for follow up evaluation.  \par \par

## 2022-08-03 NOTE — DISCUSSION/SUMMARY
[FreeTextEntry1] : 1 - Hypertension:  blood pressure well controlled on current medications.  Advised to follow strict low sodium diet and weight loss.\par \par 2 - Hyperlipidemia:  Continue to follow low fat, low cholesterol, low carbohydrate diet.  Fasting blood work through PCPs office in the next few months.\par \par 3 - Valvular heart disease:  Patient with known mild to moderate aortic stenosis in the past.  She is presently without chest pain, shortness of breath, palpitations, lightheadedness or syncope.  \par \par Echocardiogram (7/18/2022):  EF 60-65%.  Impaired relaxation pattern of LV diastolic filling.  There is mild dilatation of the ascending aorta.  Mild to moderate aortic valve stenosis.  Mild to moderate aortic regurgitation.  When compared to previous study aortic stenosis has increased.  Mild mitral annular calcification.  Mild mitral valve regurgitation.  Mild tricuspid regurgitation.  Mildly elevated pulmonary artery systolic pressure.\par \par 4 - Right shoulder pain:  She continues with her right shoulder discomfort.  Followed up with her PCP and is now doing physical therapy and has noticed some relief.\par \par 5 - Follow up with Dr. Byrne in 6 months.

## 2022-08-03 NOTE — PHYSICAL EXAM
[Well Developed] : well developed [Well Nourished] : well nourished [No Acute Distress] : no acute distress [Obese] : obese [Normal Conjunctiva] : normal conjunctiva [Normal Venous Pressure] : normal venous pressure [Normal S1, S2] : normal S1, S2 [No Rub] : no rub [S4] : S4 [Clear Lung Fields] : clear lung fields [No Respiratory Distress] : no respiratory distress  [Soft] : abdomen soft [Normal Bowel Sounds] : normal bowel sounds [Normal Gait] : normal gait [No Edema] : no edema [No Rash] : no rash [No Skin Lesions] : no skin lesions [Moves all extremities] : moves all extremities [No Focal Deficits] : no focal deficits [Normal Speech] : normal speech [Alert and Oriented] : alert and oriented [Normal memory] : normal memory [de-identified] : I-II/VI systolic murmur

## 2022-08-03 NOTE — HISTORY OF PRESENT ILLNESS
[FreeTextEntry1] : Mrs. Angie Green presents today without complaints of exertional chest pain, shortness of breath, palpitations, lightheadedness or syncope.  She is tolerating the increase in losartan to 50mg BID well and her blood pressure is under better control.  Has been compliant with her low sodium diet.  She continues with her right shoulder discomfort.  Followed up with her PCP and is now doing physical therapy and has noticed some relief.

## 2022-08-03 NOTE — CARDIOLOGY SUMMARY
[de-identified] : Sinus rhythm at 73 bpm.  Poor R-wave in leads V1-V3.  No acute ischemic changes.

## 2022-09-13 NOTE — PATIENT PROFILE ADULT. - LANGUAGE ASSISTANCE NEEDED
The supervising physician for this infusion visit is Jc Lizama MD.  Patient arrived ambulatory in stable condition for serial Venofer infusion. Patient's initial / min. WNL for patient. States she tolerated her infusion last time.   
Yes-Patient/Caregiver accepts free interpretation services...

## 2023-01-31 ENCOUNTER — APPOINTMENT (OUTPATIENT)
Dept: CARDIOLOGY | Facility: CLINIC | Age: 78
End: 2023-01-31
Payer: MEDICARE

## 2023-01-31 VITALS
HEART RATE: 73 BPM | DIASTOLIC BLOOD PRESSURE: 72 MMHG | WEIGHT: 195 LBS | RESPIRATION RATE: 16 BRPM | SYSTOLIC BLOOD PRESSURE: 150 MMHG | BODY MASS INDEX: 36.82 KG/M2 | HEIGHT: 61 IN

## 2023-01-31 PROCEDURE — 99214 OFFICE O/P EST MOD 30 MIN: CPT | Mod: 25

## 2023-01-31 PROCEDURE — 93000 ELECTROCARDIOGRAM COMPLETE: CPT

## 2023-01-31 RX ORDER — AMLODIPINE BESYLATE 10 MG/1
10 TABLET ORAL DAILY
Refills: 0 | Status: ACTIVE | COMMUNITY

## 2023-02-15 ENCOUNTER — OFFICE (OUTPATIENT)
Dept: URBAN - METROPOLITAN AREA CLINIC 94 | Facility: CLINIC | Age: 78
Setting detail: OPHTHALMOLOGY
End: 2023-02-15
Payer: MEDICAID

## 2023-02-15 DIAGNOSIS — H11.043: ICD-10-CM

## 2023-02-15 DIAGNOSIS — H04.123: ICD-10-CM

## 2023-02-15 DIAGNOSIS — H35.033: ICD-10-CM

## 2023-02-15 DIAGNOSIS — H40.033: ICD-10-CM

## 2023-02-15 PROCEDURE — 92014 COMPRE OPH EXAM EST PT 1/>: CPT | Performed by: OPHTHALMOLOGY

## 2023-02-15 PROCEDURE — 92133 CPTRZD OPH DX IMG PST SGM ON: CPT | Performed by: OPHTHALMOLOGY

## 2023-02-15 ASSESSMENT — REFRACTION_CURRENTRX
OS_SPHERE: PLANO
OD_OVR_VA: 20/
OD_SPHERE: PLANO
OD_VPRISM_DIRECTION: BF
OD_CYLINDER: -1.75
OS_OVR_VA: 20/
OD_AXIS: 080
OS_ADD: +2.50
OS_VPRISM_DIRECTION: BF
OD_ADD: +2.50

## 2023-02-15 ASSESSMENT — KERATOMETRY
OS_K2POWER_DIOPTERS: 44.75
OD_K2POWER_DIOPTERS: 44.00
OS_K1POWER_DIOPTERS: 44.00
OD_AXISANGLE_DEGREES: 090
METHOD_AUTO_MANUAL: AUTO
OS_AXISANGLE_DEGREES: 048
OD_K1POWER_DIOPTERS: 44.00

## 2023-02-15 ASSESSMENT — REFRACTION_MANIFEST
OS_AXIS: 158
OD_SPHERE: +2.75
OD_AXIS: 010
OS_VA1: 20/40
OD_VA1: 20/50-
OS_SPHERE: PLANO
OS_SPHERE: +2.75
OS_ADD: +2.50
OD_SPHERE: PLANO
OD_AXIS: 080
OD_CYLINDER: -0.75
OD_CYLINDER: -0.25
OU_VA: 20/20-
OS_CYLINDER: -0.75
OS_VA1: 20/25
OD_ADD: +2.50
OD_VA1: 20/40

## 2023-02-15 ASSESSMENT — CORNEAL PTERYGIUM
OS_PTERYGIUM: 3MM
OD_PTERYGIUM: 2MM

## 2023-02-15 ASSESSMENT — AXIALLENGTH_DERIVED
OS_AL: 23.6114
OD_AL: 23.5544
OD_AL: 22.4442
OS_AL: 22.4078

## 2023-02-15 ASSESSMENT — REFRACTION_AUTOREFRACTION
OS_SPHERE: -0.50
OS_CYLINDER: -0.75
OD_SPHERE: -0.25
OD_CYLINDER: -0.25
OD_AXIS: 096
OS_AXIS: 123

## 2023-02-15 ASSESSMENT — TONOMETRY
OD_IOP_MMHG: 17
OS_IOP_MMHG: 17

## 2023-02-15 ASSESSMENT — CONFRONTATIONAL VISUAL FIELD TEST (CVF)
OS_FINDINGS: FULL
OD_FINDINGS: FULL

## 2023-02-15 ASSESSMENT — SPHEQUIV_DERIVED
OS_SPHEQUIV: -0.875
OD_SPHEQUIV: -0.375
OS_SPHEQUIV: 2.375
OD_SPHEQUIV: 2.625

## 2023-02-15 ASSESSMENT — SUPERFICIAL PUNCTATE KERATITIS (SPK)
OS_SPK: T
OD_SPK: T

## 2023-02-15 ASSESSMENT — LID EXAM ASSESSMENTS
OD_BLEPHARITIS: RLL T
OS_BLEPHARITIS: LLL T

## 2023-02-15 ASSESSMENT — VISUAL ACUITY
OS_BCVA: 20/60+1
OD_BCVA: 20/25

## 2023-03-30 NOTE — HISTORY OF PRESENT ILLNESS
[FreeTextEntry1] : From a cardiac standpoint, Mrs. Angie Green denies exertional chest pain, but does admit to occasional episodes of shortness of breath.  Most of these are exertional.  There has been no palpitations, lightheadedness, or syncope.  \par

## 2023-03-30 NOTE — PHYSICAL EXAM
[Well Developed] : well developed [Well Nourished] : well nourished [No Acute Distress] : no acute distress [Obese] : obese [Normal Conjunctiva] : normal conjunctiva [Normal Venous Pressure] : normal venous pressure [Normal S1, S2] : normal S1, S2 [No Rub] : no rub [S4] : S4 [Clear Lung Fields] : clear lung fields [No Respiratory Distress] : no respiratory distress  [Soft] : abdomen soft [Normal Bowel Sounds] : normal bowel sounds [Normal Gait] : normal gait [No Edema] : no edema [No Rash] : no rash [No Skin Lesions] : no skin lesions [Moves all extremities] : moves all extremities [No Focal Deficits] : no focal deficits [Normal Speech] : normal speech [Alert and Oriented] : alert and oriented [Normal memory] : normal memory [de-identified] : I-II/VI systolic murmur

## 2023-03-30 NOTE — REASON FOR VISIT
[FreeTextEntry1] : Mrs. Dyer is a pleasant 77-year-old  female, native of [place], with a past medical history significant for hypertension, hyperlipidemia, diabetes mellitus, and aortic stenosis, who presents for follow up evaluation.  \par \par

## 2023-03-30 NOTE — ASSESSMENT
[FreeTextEntry1] : \par 1. EKG today reveals sinus rhythm at 73 bpm.  Occasional APCs.  Poor R-wave progression leads V1 through V3.  Non-specific ST-T changes.  \par \par 2. Hypertension:  Blood pressure borderline controlled at this time on current medications.  Patient advised on a strict low-salt diet as well as weight loss, and continuation of diuretic therapy.  She will continue with Losartan 100 mg daily.    \par \par 3. Hyperlipidemia:  No recent lipid profile available.  Will obtain one prior to next office visit. \par \par 4. Aortic stenosis:  Most recent echocardiography revealed normal left ventricular chamber dimensions and wall motion with preserved ejection fraction estimated between 60 and 65%.  Mild to moderate aortic stenosis is noted.  This is associated with mild to moderate aortic valvular insufficiency.  Pulmonary artery pressure minimally elevated.  Patient advised to continue walking and lose weight.  Will reevaluate in 3-6 months.  \par

## 2023-04-14 ENCOUNTER — APPOINTMENT (OUTPATIENT)
Dept: CARDIOLOGY | Facility: CLINIC | Age: 78
End: 2023-04-14
Payer: MEDICARE

## 2023-04-14 VITALS
BODY MASS INDEX: 36.82 KG/M2 | HEIGHT: 61 IN | HEART RATE: 68 BPM | WEIGHT: 195 LBS | RESPIRATION RATE: 16 BRPM | SYSTOLIC BLOOD PRESSURE: 152 MMHG | DIASTOLIC BLOOD PRESSURE: 76 MMHG

## 2023-04-14 PROCEDURE — 93000 ELECTROCARDIOGRAM COMPLETE: CPT

## 2023-04-14 PROCEDURE — 99214 OFFICE O/P EST MOD 30 MIN: CPT | Mod: 25

## 2023-04-17 NOTE — REASON FOR VISIT
[FreeTextEntry1] : Mrs. Dyer is a pleasant, 77-year-old,  female native of [facility] with a past medical history significant for hypertension, hyperlipidemia, diabetes mellitus, and aortic stenosis, who presents for follow up.\par \par

## 2023-04-17 NOTE — ASSESSMENT
[FreeTextEntry1] : 1. EKG today demonstrates normal sinus rhythm at 68 bpm. Normal intervals. Nonspecific ST-T wave changes.\par \par 2. Hypertension: Blood pressure is suboptimally controlled at this time. The patient is less than ideally compliant with a low-salt diet. Remains obese and underactive. I have recommended that she augment Hydrochlorothiazide to 25 mg q.d.  The patient will follow a stricter low-salt diet and begin walking in order to lose weight. \par \par 3. Hyperlipidemia: No recent blood work available for review. I will have the patient undergo fasting blood work prior to her next office visit.\par \par 4. Aortic stenosis: Clinically stable without chest pain, shortness of breath, or other symptoms. Known to have mild to moderate stenosis on echocardiography last summer. Will undergo follow up echo in approximately six months. \par

## 2023-04-17 NOTE — PHYSICAL EXAM
[Well Developed] : well developed [Well Nourished] : well nourished [No Acute Distress] : no acute distress [Obese] : obese [Normal Conjunctiva] : normal conjunctiva [Normal Venous Pressure] : normal venous pressure [Normal S1, S2] : normal S1, S2 [No Rub] : no rub [S4] : S4 [Clear Lung Fields] : clear lung fields [No Respiratory Distress] : no respiratory distress  [Soft] : abdomen soft [Normal Bowel Sounds] : normal bowel sounds [Normal Gait] : normal gait [No Edema] : no edema [No Rash] : no rash [No Skin Lesions] : no skin lesions [Moves all extremities] : moves all extremities [No Focal Deficits] : no focal deficits [Normal Speech] : normal speech [Alert and Oriented] : alert and oriented [Normal memory] : normal memory [de-identified] : I-II/VI systolic murmur

## 2023-04-17 NOTE — HISTORY OF PRESENT ILLNESS
[FreeTextEntry1] : From the cardiac standpoint, Mrs. Dyer denies exertional chest pain, shortness of breath or other symptoms. She admits to being less than ideally compliant with a low-salt diet.

## 2023-05-25 ENCOUNTER — APPOINTMENT (OUTPATIENT)
Dept: CARDIOLOGY | Facility: CLINIC | Age: 78
End: 2023-05-25

## 2023-09-11 NOTE — OCCUPATIONAL THERAPY INITIAL EVALUATION ADULT - TOILETING, PREVIOUS LEVEL OF FUNCTION, OT EVAL
Physical Therapy    Patient not seen in therapy.     Unavailable due to medical tests/procedures.      Pt off floor for CT and then is supposed to get an US to rule out DVT.  Visit Type: treatment  SUBJECTIVE  Patient agreed to participate in therapy this date.             OBJECTIVE                         Therapy procedure time and total treatment time can be found documented on the Time Entry flowsheet   independent

## 2023-10-20 ENCOUNTER — APPOINTMENT (OUTPATIENT)
Dept: CARDIOLOGY | Facility: CLINIC | Age: 78
End: 2023-10-20
Payer: MEDICARE

## 2023-10-20 PROCEDURE — 93306 TTE W/DOPPLER COMPLETE: CPT

## 2023-11-14 ENCOUNTER — APPOINTMENT (OUTPATIENT)
Dept: CARDIOLOGY | Facility: CLINIC | Age: 78
End: 2023-11-14
Payer: MEDICARE

## 2023-11-14 ENCOUNTER — NON-APPOINTMENT (OUTPATIENT)
Age: 78
End: 2023-11-14

## 2023-11-14 VITALS
DIASTOLIC BLOOD PRESSURE: 70 MMHG | WEIGHT: 189 LBS | HEIGHT: 61 IN | SYSTOLIC BLOOD PRESSURE: 150 MMHG | RESPIRATION RATE: 16 BRPM | HEART RATE: 68 BPM | BODY MASS INDEX: 35.68 KG/M2

## 2023-11-14 PROCEDURE — 93000 ELECTROCARDIOGRAM COMPLETE: CPT

## 2023-11-14 PROCEDURE — 99214 OFFICE O/P EST MOD 30 MIN: CPT | Mod: 25

## 2023-11-14 RX ORDER — VITAMIN K2 90 MCG
125 MCG CAPSULE ORAL
Qty: 90 | Refills: 1 | Status: DISCONTINUED | COMMUNITY
Start: 2021-09-01 | End: 2023-11-14

## 2023-11-14 RX ORDER — METFORMIN HYDROCHLORIDE 850 MG/1
850 TABLET, COATED ORAL DAILY
Qty: 90 | Refills: 0 | Status: DISCONTINUED | COMMUNITY
End: 2023-11-14

## 2023-11-14 RX ORDER — IBUPROFEN 400 MG/1
400 TABLET, FILM COATED ORAL 3 TIMES DAILY
Qty: 21 | Refills: 1 | Status: DISCONTINUED | COMMUNITY
Start: 2022-06-24 | End: 2023-11-14

## 2023-11-14 RX ORDER — LOSARTAN POTASSIUM 100 MG/1
100 TABLET, FILM COATED ORAL DAILY
Qty: 90 | Refills: 3 | Status: DISCONTINUED | COMMUNITY
Start: 2021-07-02 | End: 2023-11-14

## 2023-12-01 ENCOUNTER — APPOINTMENT (OUTPATIENT)
Dept: PULMONOLOGY | Facility: CLINIC | Age: 78
End: 2023-12-01
Payer: MEDICARE

## 2023-12-01 ENCOUNTER — NON-APPOINTMENT (OUTPATIENT)
Age: 78
End: 2023-12-01

## 2023-12-01 VITALS
RESPIRATION RATE: 18 BRPM | BODY MASS INDEX: 36.06 KG/M2 | HEIGHT: 61 IN | SYSTOLIC BLOOD PRESSURE: 152 MMHG | HEART RATE: 74 BPM | WEIGHT: 191 LBS | DIASTOLIC BLOOD PRESSURE: 82 MMHG | OXYGEN SATURATION: 96 %

## 2023-12-01 DIAGNOSIS — R06.00 DYSPNEA, UNSPECIFIED: ICD-10-CM

## 2023-12-01 PROCEDURE — 71046 X-RAY EXAM CHEST 2 VIEWS: CPT

## 2023-12-01 PROCEDURE — 99204 OFFICE O/P NEW MOD 45 MIN: CPT | Mod: 25

## 2023-12-11 ENCOUNTER — APPOINTMENT (OUTPATIENT)
Dept: PULMONOLOGY | Facility: CLINIC | Age: 78
End: 2023-12-11

## 2024-01-09 ENCOUNTER — APPOINTMENT (OUTPATIENT)
Dept: PULMONOLOGY | Facility: CLINIC | Age: 79
End: 2024-01-09
Payer: MEDICARE

## 2024-01-09 PROCEDURE — 95800 SLP STDY UNATTENDED: CPT

## 2024-01-10 ENCOUNTER — APPOINTMENT (OUTPATIENT)
Dept: CARDIOLOGY | Facility: CLINIC | Age: 79
End: 2024-01-10
Payer: MEDICARE

## 2024-01-10 ENCOUNTER — NON-APPOINTMENT (OUTPATIENT)
Age: 79
End: 2024-01-10

## 2024-01-10 VITALS
SYSTOLIC BLOOD PRESSURE: 140 MMHG | RESPIRATION RATE: 16 BRPM | HEART RATE: 72 BPM | BODY MASS INDEX: 36.44 KG/M2 | HEIGHT: 61 IN | DIASTOLIC BLOOD PRESSURE: 76 MMHG | WEIGHT: 193 LBS

## 2024-01-10 DIAGNOSIS — I27.20 PULMONARY HYPERTENSION, UNSPECIFIED: ICD-10-CM

## 2024-01-10 PROCEDURE — 99214 OFFICE O/P EST MOD 30 MIN: CPT | Mod: 25

## 2024-01-10 PROCEDURE — 95800 SLP STDY UNATTENDED: CPT

## 2024-01-10 PROCEDURE — 93000 ELECTROCARDIOGRAM COMPLETE: CPT

## 2024-01-10 RX ORDER — LOSARTAN POTASSIUM AND HYDROCHLOROTHIAZIDE 25; 100 MG/1; MG/1
100-25 TABLET ORAL DAILY
Qty: 90 | Refills: 3 | Status: ACTIVE | COMMUNITY
Start: 2024-01-10 | End: 1900-01-01

## 2024-01-10 RX ORDER — HYDROCHLOROTHIAZIDE 12.5 MG/1
12.5 CAPSULE ORAL DAILY
Qty: 90 | Refills: 1 | Status: DISCONTINUED | COMMUNITY
End: 2024-01-10

## 2024-01-10 NOTE — DISCUSSION/SUMMARY
[FreeTextEntry1] : 1 - Hypertension:  initial pressure 140/76, repeat 132/68.  Patient with history of dilatation of the ascending aorta (3.9 cm).  Mild bilateral lower extremity edema.  Will increase HCTZ to 25mg daily, continue losartan 100mg daily.  Follow low sodium diet.  2 - Dyspnea on exertion:  presents today with continuing shortness of breath both with and without exertion.  Denies chest pain, palpitations, lightheadedness or syncope.  Patient with known moderate aortic stenosis.  Moderate to severe pulmonary hypertension.   Was recently seen by pulmonary to assess her pulmonary hypertension.  She is scheduled for an at home sleep study.  Will schedule Mrs. Adams for a 2-day nuclear stress test to assess coronary circulation.  3 - Fasting blood work prior to follow up visit.  4 - Follow up after testing is completed. [EKG obtained to assist in diagnosis and management of assessed problem(s)] : EKG obtained to assist in diagnosis and management of assessed problem(s)

## 2024-01-10 NOTE — PHYSICAL EXAM
[Well Developed] : well developed [No Acute Distress] : no acute distress [Obese] : obese [Normal Conjunctiva] : normal conjunctiva [Normal Venous Pressure] : normal venous pressure [No Carotid Bruit] : no carotid bruit [Normal S1, S2] : normal S1, S2 [No Rub] : no rub [S4] : S4 [Clear Lung Fields] : clear lung fields [No Respiratory Distress] : no respiratory distress  [Normal Bowel Sounds] : normal bowel sounds [Normal Gait] : normal gait [No Rash] : no rash [Moves all extremities] : moves all extremities [No Focal Deficits] : no focal deficits [Normal Speech] : normal speech [Alert and Oriented] : alert and oriented [Normal memory] : normal memory [de-identified] : I-II/VI systolic murmur [de-identified] : Trace bilateral LE edema

## 2024-01-10 NOTE — REASON FOR VISIT
[FreeTextEntry1] : Mrs. Dyer is a pleasant, 78-year-old,  female native of [facility] with a past medical history significant for hypertension, hyperlipidemia, diabetes mellitus, and aortic stenosis, who presents for follow up.

## 2024-01-10 NOTE — HISTORY OF PRESENT ILLNESS
[FreeTextEntry1] : Mrs. Angie Green presents today with continuing shortness of breath both with and without exertion.  Denies chest pain, palpitations, lightheadedness or syncope.  Was recently seen by pulmonary to assess her pulmonary hypertension.  She is scheduled for an at home sleep study.

## 2024-01-10 NOTE — CARDIOLOGY SUMMARY
[de-identified] : Sinus rhythm at 72 bpm.  Normal intervals.  Poor R-wave progression in V1-V3.  No acute ischemic changes.

## 2024-01-29 ENCOUNTER — APPOINTMENT (OUTPATIENT)
Dept: PULMONOLOGY | Facility: CLINIC | Age: 79
End: 2024-01-29

## 2024-02-01 ENCOUNTER — APPOINTMENT (OUTPATIENT)
Dept: PULMONOLOGY | Facility: CLINIC | Age: 79
End: 2024-02-01
Payer: MEDICARE

## 2024-02-01 VITALS
BODY MASS INDEX: 37 KG/M2 | HEIGHT: 61 IN | HEART RATE: 84 BPM | OXYGEN SATURATION: 96 % | SYSTOLIC BLOOD PRESSURE: 149 MMHG | DIASTOLIC BLOOD PRESSURE: 80 MMHG | WEIGHT: 196 LBS

## 2024-02-01 PROCEDURE — 99213 OFFICE O/P EST LOW 20 MIN: CPT

## 2024-02-01 NOTE — PROCEDURE
[FreeTextEntry1] : Reviewed:  HST--moderate ghassan, ahi 21  CXR: right mid lung linear atelectasis, no focal consolidation or pleural effusions, cardiac silhouette appears normal.  No bony abnormality.  unable to perform spriometry

## 2024-02-01 NOTE — ASSESSMENT
[FreeTextEntry1] : discussed the meaning of sleep apnea and how it could relate to her heart disease/pulm htn/TINOCO  I suggest she try auto cpap, she is reluctant but agrees to give it a try   i will see her again after she has used cpap for 1 mo

## 2024-02-14 ENCOUNTER — APPOINTMENT (OUTPATIENT)
Dept: CARDIOLOGY | Facility: CLINIC | Age: 79
End: 2024-02-14

## 2024-02-15 ENCOUNTER — APPOINTMENT (OUTPATIENT)
Dept: CARDIOLOGY | Facility: CLINIC | Age: 79
End: 2024-02-15
Payer: MEDICARE

## 2024-02-15 PROCEDURE — 78452 HT MUSCLE IMAGE SPECT MULT: CPT

## 2024-02-15 PROCEDURE — A9500: CPT

## 2024-02-15 PROCEDURE — 93015 CV STRESS TEST SUPVJ I&R: CPT

## 2024-02-15 RX ORDER — REGADENOSON 0.08 MG/ML
0.4 INJECTION, SOLUTION INTRAVENOUS
Qty: 1 | Refills: 0 | Status: COMPLETED | OUTPATIENT
Start: 2024-02-15

## 2024-02-15 RX ORDER — AMINOPHYLLINE 25 MG/ML
25 INJECTION, SOLUTION INTRAVENOUS
Qty: 0 | Refills: 0 | Status: COMPLETED | OUTPATIENT
Start: 2024-02-15

## 2024-02-15 RX ADMIN — REGADENOSON 0 MG/5ML: 0.08 INJECTION, SOLUTION INTRAVENOUS at 00:00

## 2024-03-27 ENCOUNTER — NON-APPOINTMENT (OUTPATIENT)
Age: 79
End: 2024-03-27

## 2024-03-27 ENCOUNTER — RESULT CHARGE (OUTPATIENT)
Age: 79
End: 2024-03-27

## 2024-03-27 ENCOUNTER — APPOINTMENT (OUTPATIENT)
Dept: CARDIOLOGY | Facility: CLINIC | Age: 79
End: 2024-03-27
Payer: MEDICARE

## 2024-03-27 VITALS
WEIGHT: 200 LBS | DIASTOLIC BLOOD PRESSURE: 70 MMHG | HEART RATE: 73 BPM | HEIGHT: 61 IN | RESPIRATION RATE: 16 BRPM | SYSTOLIC BLOOD PRESSURE: 130 MMHG | BODY MASS INDEX: 37.76 KG/M2

## 2024-03-27 DIAGNOSIS — E78.5 HYPERLIPIDEMIA, UNSPECIFIED: ICD-10-CM

## 2024-03-27 DIAGNOSIS — E11.9 TYPE 2 DIABETES MELLITUS W/OUT COMPLICATIONS: ICD-10-CM

## 2024-03-27 DIAGNOSIS — I10 ESSENTIAL (PRIMARY) HYPERTENSION: ICD-10-CM

## 2024-03-27 DIAGNOSIS — I35.0 NONRHEUMATIC AORTIC (VALVE) STENOSIS: ICD-10-CM

## 2024-03-27 PROCEDURE — G2211 COMPLEX E/M VISIT ADD ON: CPT

## 2024-03-27 PROCEDURE — 99214 OFFICE O/P EST MOD 30 MIN: CPT

## 2024-03-27 PROCEDURE — 93000 ELECTROCARDIOGRAM COMPLETE: CPT

## 2024-03-28 RX ORDER — KIT FOR THE PREPARATION OF TECHNETIUM TC99M SESTAMIBI 1 MG/5ML
INJECTION, POWDER, LYOPHILIZED, FOR SOLUTION PARENTERAL
Refills: 0 | Status: COMPLETED | OUTPATIENT
Start: 2024-03-28

## 2024-03-28 RX ADMIN — KIT FOR THE PREPARATION OF TECHNETIUM TC99M SESTAMIBI 0: 1 INJECTION, POWDER, LYOPHILIZED, FOR SOLUTION PARENTERAL at 00:00

## 2024-03-28 NOTE — ASSESSMENT
[FreeTextEntry1] : 1. EKG today demonstrates normal sinus rhythm at 73 bpm. Normal intervals. Non-specific ST-T changes.   2. Hypertension: Blood pressure well controlled at this time on current medications. Patient recently had HCTZ increased to 25 mg daily. A strict low-salt diet was discussed as was weight loss.   3. Hyperlipidemia: There is no recent bloodwork available for review. Her last lipid profile revealed total cholesterol of 197, HDL 41, , triglycerides 162. Patient advised on a stricter low-fat / low-cholesterol diet. Will undergo repeat bloodwork in the near future.   4. A nuclear stress test was performed to assess coronary circulation. There is no evidence of pharmacologically induced reversible ischemia or fixed defects to suggest an antecedent infarction. The gated portion of this evaluation revealed normal resting left ventricular systolic function.   5. The patient was reassured and advised to walk as much as possible. A low-fat / low-cholesterol diet discussed.   6. Progressive calcific aortic stenosis: Clinically stable at this time without chest pain or shortness of breath. Patient is known to have moderate aortic stenosis as well as mild aortic insufficiency. No specific therapy at this time. Echocardiography likely by the end of 2024 or early 2025.

## 2024-03-28 NOTE — HISTORY OF PRESENT ILLNESS
[FreeTextEntry1] : From a cardiac standpoint, Mrs. Angie Green denies exertional chest pain, shortness of breath, or other cardiac symptoms. She recently underwent nuclear stress testing to assess coronary circulation.

## 2024-03-28 NOTE — PHYSICAL EXAM
[Well Developed] : well developed [No Acute Distress] : no acute distress [Obese] : obese [Normal Conjunctiva] : normal conjunctiva [Normal Venous Pressure] : normal venous pressure [Normal S1, S2] : normal S1, S2 [No Carotid Bruit] : no carotid bruit [No Rub] : no rub [S4] : S4 [Clear Lung Fields] : clear lung fields [No Respiratory Distress] : no respiratory distress  [Normal Bowel Sounds] : normal bowel sounds [Normal Gait] : normal gait [Moves all extremities] : moves all extremities [No Rash] : no rash [No Focal Deficits] : no focal deficits [Normal Speech] : normal speech [Alert and Oriented] : alert and oriented [Normal memory] : normal memory [de-identified] : I-II/VI systolic murmur [de-identified] : Trace bilateral LE edema

## 2024-03-28 NOTE — REASON FOR VISIT
[FreeTextEntry1] : Mrs. Dyer is a delightful 78-year-old  female, native of [facility], with a past medical history significant for hypertension, hyperlipidemia, diabetes mellitus, and calcific aortic stenosis, who presents for follow up evaluation.

## 2024-04-12 ENCOUNTER — APPOINTMENT (OUTPATIENT)
Dept: PULMONOLOGY | Facility: CLINIC | Age: 79
End: 2024-04-12
Payer: MEDICARE

## 2024-04-12 VITALS
SYSTOLIC BLOOD PRESSURE: 162 MMHG | WEIGHT: 200 LBS | HEART RATE: 67 BPM | OXYGEN SATURATION: 95 % | BODY MASS INDEX: 37.76 KG/M2 | HEIGHT: 61 IN | DIASTOLIC BLOOD PRESSURE: 81 MMHG

## 2024-04-12 DIAGNOSIS — G47.33 OBSTRUCTIVE SLEEP APNEA (ADULT) (PEDIATRIC): ICD-10-CM

## 2024-04-12 PROCEDURE — 99213 OFFICE O/P EST LOW 20 MIN: CPT

## 2024-04-12 NOTE — REASON FOR VISIT
[Follow-Up] : a follow-up visit [Sleep Apnea] : sleep apnea [[employer] ] : provided by [employer]   [Time Spent: ____ minutes] : Total time spent using  services: [unfilled] minutes. The patient's primary language is not English thus required  services. [Interpreters_IDNumber] : [id number]

## 2024-04-12 NOTE — HISTORY OF PRESENT ILLNESS
[TextBox_4] : not able to tolerate cpap can only use for an hour and has to take it off wants to return it

## 2024-04-12 NOTE — ASSESSMENT
[FreeTextEntry1] : moderate ghassan suggest weight loss will try oral appliance instead will return cpap as she does not seem amenable to continued use of cpap.

## 2024-06-05 ENCOUNTER — RX RENEWAL (OUTPATIENT)
Age: 79
End: 2024-06-05

## 2024-06-27 ENCOUNTER — RX RENEWAL (OUTPATIENT)
Age: 79
End: 2024-06-27

## 2024-07-30 ENCOUNTER — APPOINTMENT (OUTPATIENT)
Dept: CARDIOLOGY | Facility: CLINIC | Age: 79
End: 2024-07-30

## 2024-07-31 ENCOUNTER — NON-APPOINTMENT (OUTPATIENT)
Age: 79
End: 2024-07-31

## 2024-07-31 ENCOUNTER — APPOINTMENT (OUTPATIENT)
Dept: CARDIOLOGY | Facility: CLINIC | Age: 79
End: 2024-07-31
Payer: MEDICARE

## 2024-07-31 VITALS
SYSTOLIC BLOOD PRESSURE: 128 MMHG | HEART RATE: 62 BPM | HEIGHT: 61 IN | BODY MASS INDEX: 37.76 KG/M2 | RESPIRATION RATE: 16 BRPM | WEIGHT: 200 LBS | DIASTOLIC BLOOD PRESSURE: 62 MMHG

## 2024-07-31 DIAGNOSIS — E11.9 TYPE 2 DIABETES MELLITUS W/OUT COMPLICATIONS: ICD-10-CM

## 2024-07-31 DIAGNOSIS — I35.0 NONRHEUMATIC AORTIC (VALVE) STENOSIS: ICD-10-CM

## 2024-07-31 DIAGNOSIS — E78.5 HYPERLIPIDEMIA, UNSPECIFIED: ICD-10-CM

## 2024-07-31 DIAGNOSIS — I10 ESSENTIAL (PRIMARY) HYPERTENSION: ICD-10-CM

## 2024-07-31 PROCEDURE — 93000 ELECTROCARDIOGRAM COMPLETE: CPT

## 2024-07-31 PROCEDURE — 99214 OFFICE O/P EST MOD 30 MIN: CPT

## 2024-07-31 NOTE — PHYSICAL EXAM
[Well Developed] : well developed [No Acute Distress] : no acute distress [Obese] : obese [Normal Conjunctiva] : normal conjunctiva [Normal Venous Pressure] : normal venous pressure [No Carotid Bruit] : no carotid bruit [Normal S1, S2] : normal S1, S2 [No Rub] : no rub [S4] : S4 [Clear Lung Fields] : clear lung fields [No Respiratory Distress] : no respiratory distress  [Normal Bowel Sounds] : normal bowel sounds [Normal Gait] : normal gait [No Rash] : no rash [Moves all extremities] : moves all extremities [No Focal Deficits] : no focal deficits [Normal Speech] : normal speech [Alert and Oriented] : alert and oriented [Normal memory] : normal memory [No Edema] : no edema [de-identified] : I-II/VI systolic murmur

## 2024-07-31 NOTE — PHYSICAL EXAM
[Well Developed] : well developed [No Acute Distress] : no acute distress [Obese] : obese [Normal Conjunctiva] : normal conjunctiva [Normal Venous Pressure] : normal venous pressure [No Carotid Bruit] : no carotid bruit [Normal S1, S2] : normal S1, S2 [No Rub] : no rub [S4] : S4 [Clear Lung Fields] : clear lung fields [No Respiratory Distress] : no respiratory distress  [Normal Bowel Sounds] : normal bowel sounds [Normal Gait] : normal gait [No Rash] : no rash [Moves all extremities] : moves all extremities [No Focal Deficits] : no focal deficits [Normal Speech] : normal speech [Alert and Oriented] : alert and oriented [Normal memory] : normal memory [No Edema] : no edema [de-identified] : I-II/VI systolic murmur

## 2024-07-31 NOTE — DISCUSSION/SUMMARY
[EKG obtained to assist in diagnosis and management of assessed problem(s)] : EKG obtained to assist in diagnosis and management of assessed problem(s) [FreeTextEntry1] : 1 - Hypertension:  blood pressure well controlled on current medications.  Continue amlodipine 10mg daily and losartan-HCTZ 100/25mg daily.  Advised to follow low sodium diet and weight loss.    2 - Hyperlipidemia:  [id number], HDL 40, triglycerides 223, .  Patient to follow low fat, low cholesterol, low carb diet.  Increase physical activity/exercise.  Repeat fasting blood work prior to follow up visit.  3 - Progressive calcific aortic stenosis:  clinically stable at this time.  Denies chest pain, shortness of breath, palpitations, lightheadedness or syncope.  Patient with known moderate aortic stenosis as well as mild aortic insufficiency.  Will schedule for echocardiogram prior to her next office visit.  4 - Recent labs (6/14/2024):  BUN 17, creatinine 0.86, potassium 4.2, H/H 14/41.8, platelets 221, fasting glucose 117, HgA1c 6.4 (Followed by PCP, low carb diet).  5 - Follow up with Dr. Byrne in 6 months.

## 2024-07-31 NOTE — HISTORY OF PRESENT ILLNESS
[FreeTextEntry1] : Mrs. Kim presents today without complaints of exertional chest pain, shortness of breath, palpitations, lightheadedness or syncope.

## 2024-07-31 NOTE — DISCUSSION/SUMMARY
[EKG obtained to assist in diagnosis and management of assessed problem(s)] : EKG obtained to assist in diagnosis and management of assessed problem(s) [FreeTextEntry1] : 1 - Hypertension:  blood pressure well controlled on current medications.  Continue amlodipine 10mg daily and losartan-HCTZ 100/25mg daily.  Advised to follow low sodium diet and weight loss.    2 - Hyperlipidemia:  [id number], HDL 40, triglycerides 223, .  Patient to follow low fat, low cholesterol, low carb diet.  Increase physical activity/exercise.  Repeat fasting blood work prior to follow up visit.  3 - Progressive calcific aortic stenosis:  clinically stable at this time.  Denies chest pain, shortness of breath, palpitations, lightheadedness or syncope.  Patient with known moderate aortic stenosis as well as mild aortic insufficiency.  Will schedule for echocardiogram prior to her next office visit.  4 - Recent labs (6/14/2024):  BUN 17, creatinine 0.86, potassium 4.2, H/H 14/41.8, platelets 221, fasting glucose 117, HgA1c 6.4 (Followed by PCP, low carb diet).  5 - Follow up with Dr. Byrne in 6 months.

## 2024-07-31 NOTE — REASON FOR VISIT
[FreeTextEntry1] : Mrs. Dyer is a delightful 78-year-old  female, native of [place], with a past medical history significant for hypertension, hyperlipidemia, diabetes mellitus, and calcific aortic stenosis, who presents for follow up evaluation.

## 2024-07-31 NOTE — REASON FOR VISIT
[FreeTextEntry1] : Mrs. Dyer is a delightful 78-year-old  female, native of [facility], with a past medical history significant for hypertension, hyperlipidemia, diabetes mellitus, and calcific aortic stenosis, who presents for follow up evaluation.

## 2024-08-22 LAB — HBA1C MFR BLD HPLC: 6.4

## 2025-01-22 ENCOUNTER — OFFICE (OUTPATIENT)
Dept: URBAN - METROPOLITAN AREA CLINIC 94 | Facility: CLINIC | Age: 80
Setting detail: OPHTHALMOLOGY
End: 2025-01-22
Payer: MEDICAID

## 2025-01-22 DIAGNOSIS — H40.033: ICD-10-CM

## 2025-01-22 DIAGNOSIS — H35.033: ICD-10-CM

## 2025-01-22 DIAGNOSIS — H11.043: ICD-10-CM

## 2025-01-22 DIAGNOSIS — H04.123: ICD-10-CM

## 2025-01-22 PROCEDURE — 92083 EXTENDED VISUAL FIELD XM: CPT | Performed by: OPHTHALMOLOGY

## 2025-01-22 PROCEDURE — 92250 FUNDUS PHOTOGRAPHY W/I&R: CPT | Performed by: OPHTHALMOLOGY

## 2025-01-22 PROCEDURE — 92014 COMPRE OPH EXAM EST PT 1/>: CPT | Performed by: OPHTHALMOLOGY

## 2025-01-22 ASSESSMENT — REFRACTION_MANIFEST
OD_CYLINDER: -0.25
OS_ADD: +2.50
OS_SPHERE: PLANO
OD_SPHERE: PLANO
OD_VA1: 20/40
OS_AXIS: 158
OD_ADD: +2.50
OS_SPHERE: +2.75
OD_SPHERE: +2.75
OS_VA1: 20/25
OU_VA: 20/20-
OS_VA1: 20/40
OD_AXIS: 080
OD_VA1: 20/50-
OD_CYLINDER: -0.75
OS_CYLINDER: -0.75
OD_AXIS: 010

## 2025-01-22 ASSESSMENT — REFRACTION_AUTOREFRACTION
OD_AXIS: 090
OS_AXIS: 096
OD_SPHERE: 0.00
OD_CYLINDER: -0.50
OS_CYLINDER: -0.50
OS_SPHERE: +0.25

## 2025-01-22 ASSESSMENT — REFRACTION_CURRENTRX
OD_CYLINDER: -1.75
OD_OVR_VA: 20/
OS_SPHERE: PLANO
OD_ADD: +2.50
OS_OVR_VA: 20/
OD_SPHERE: PLANO
OD_AXIS: 080
OD_VPRISM_DIRECTION: BF
OS_ADD: +2.50
OS_VPRISM_DIRECTION: BF

## 2025-01-22 ASSESSMENT — SUPERFICIAL PUNCTATE KERATITIS (SPK)
OS_SPK: T
OD_SPK: T

## 2025-01-22 ASSESSMENT — CONFRONTATIONAL VISUAL FIELD TEST (CVF)
OD_FINDINGS: FULL
OS_FINDINGS: FULL

## 2025-01-22 ASSESSMENT — KERATOMETRY
METHOD_AUTO_MANUAL: AUTO
OS_AXISANGLE_DEGREES: 061
OS_K1POWER_DIOPTERS: 44.75
OS_K2POWER_DIOPTERS: 45.00
OD_K1POWER_DIOPTERS: 44.00
OD_AXISANGLE_DEGREES: 005
OD_K2POWER_DIOPTERS: 44.25

## 2025-01-22 ASSESSMENT — TONOMETRY
OD_IOP_MMHG: 12
OS_IOP_MMHG: 12

## 2025-01-22 ASSESSMENT — LID EXAM ASSESSMENTS
OD_BLEPHARITIS: RLL T
OS_BLEPHARITIS: LLL T

## 2025-01-22 ASSESSMENT — CORNEAL PTERYGIUM
OS_PTERYGIUM: 3MM
OD_PTERYGIUM: 2MM

## 2025-01-22 ASSESSMENT — VISUAL ACUITY
OD_BCVA: 20/30
OS_BCVA: 20/60

## 2025-01-27 ENCOUNTER — APPOINTMENT (OUTPATIENT)
Dept: CARDIOLOGY | Facility: CLINIC | Age: 80
End: 2025-01-27
Payer: MEDICARE

## 2025-01-27 PROCEDURE — 93306 TTE W/DOPPLER COMPLETE: CPT

## 2025-01-29 ENCOUNTER — OFFICE (OUTPATIENT)
Dept: URBAN - METROPOLITAN AREA CLINIC 94 | Facility: CLINIC | Age: 80
Setting detail: OPHTHALMOLOGY
End: 2025-01-29
Payer: MEDICAID

## 2025-01-29 DIAGNOSIS — H35.341: ICD-10-CM

## 2025-01-29 PROCEDURE — 92134 CPTRZ OPH DX IMG PST SGM RTA: CPT | Performed by: OPHTHALMOLOGY

## 2025-01-29 PROCEDURE — 92012 INTRM OPH EXAM EST PATIENT: CPT | Performed by: OPHTHALMOLOGY

## 2025-01-29 ASSESSMENT — CORNEAL PTERYGIUM
OD_PTERYGIUM: 2MM
OS_PTERYGIUM: 3MM

## 2025-01-29 ASSESSMENT — REFRACTION_AUTOREFRACTION
OS_AXIS: 096
OS_SPHERE: +0.25
OD_SPHERE: 0.00
OD_CYLINDER: -0.50
OD_AXIS: 090
OS_CYLINDER: -0.50

## 2025-01-29 ASSESSMENT — CONFRONTATIONAL VISUAL FIELD TEST (CVF)
OS_FINDINGS: FULL
OD_FINDINGS: FULL

## 2025-01-29 ASSESSMENT — KERATOMETRY
OD_K1POWER_DIOPTERS: 44.00
OS_AXISANGLE_DEGREES: 061
METHOD_AUTO_MANUAL: AUTO
OS_K2POWER_DIOPTERS: 45.00
OD_AXISANGLE_DEGREES: 005
OD_K2POWER_DIOPTERS: 44.25
OS_K1POWER_DIOPTERS: 44.75

## 2025-01-29 ASSESSMENT — VISUAL ACUITY
OD_BCVA: 20/40-
OS_BCVA: 20/80

## 2025-01-29 ASSESSMENT — TONOMETRY
OS_IOP_MMHG: 12
OD_IOP_MMHG: 13

## 2025-01-29 ASSESSMENT — LID EXAM ASSESSMENTS
OS_BLEPHARITIS: LLL T
OD_BLEPHARITIS: RLL T

## 2025-01-29 ASSESSMENT — SUPERFICIAL PUNCTATE KERATITIS (SPK)
OS_SPK: T
OD_SPK: T

## 2025-01-31 ENCOUNTER — APPOINTMENT (OUTPATIENT)
Dept: CARDIOLOGY | Facility: CLINIC | Age: 80
End: 2025-01-31
Payer: MEDICARE

## 2025-01-31 VITALS
OXYGEN SATURATION: 98 % | RESPIRATION RATE: 16 BRPM | HEART RATE: 74 BPM | SYSTOLIC BLOOD PRESSURE: 130 MMHG | BODY MASS INDEX: 37.76 KG/M2 | WEIGHT: 200 LBS | HEIGHT: 61 IN | DIASTOLIC BLOOD PRESSURE: 80 MMHG

## 2025-01-31 DIAGNOSIS — E11.9 TYPE 2 DIABETES MELLITUS W/OUT COMPLICATIONS: ICD-10-CM

## 2025-01-31 DIAGNOSIS — E78.5 HYPERLIPIDEMIA, UNSPECIFIED: ICD-10-CM

## 2025-01-31 DIAGNOSIS — M25.561 PAIN IN RIGHT KNEE: ICD-10-CM

## 2025-01-31 DIAGNOSIS — I35.0 NONRHEUMATIC AORTIC (VALVE) STENOSIS: ICD-10-CM

## 2025-01-31 DIAGNOSIS — M25.562 PAIN IN RIGHT KNEE: ICD-10-CM

## 2025-01-31 DIAGNOSIS — I10 ESSENTIAL (PRIMARY) HYPERTENSION: ICD-10-CM

## 2025-01-31 PROCEDURE — 99214 OFFICE O/P EST MOD 30 MIN: CPT

## 2025-01-31 PROCEDURE — G2211 COMPLEX E/M VISIT ADD ON: CPT

## 2025-01-31 PROCEDURE — 93000 ELECTROCARDIOGRAM COMPLETE: CPT

## 2025-02-03 NOTE — DISCHARGE NOTE PROVIDER - NSD[facility]_MEDRECSTATUS_GEN_ALL_CORE
Admission Reconciliation is Completed  Discharge Reconciliation is Not Complete Admission Reconciliation is Completed  Discharge Reconciliation is Completed 0 (no pain/absence of nonverbal indicators of pain)

## 2025-05-28 ENCOUNTER — OFFICE (OUTPATIENT)
Dept: URBAN - METROPOLITAN AREA CLINIC 94 | Facility: CLINIC | Age: 80
Setting detail: OPHTHALMOLOGY
End: 2025-05-28
Payer: MEDICAID

## 2025-05-28 DIAGNOSIS — E11.3293: ICD-10-CM

## 2025-05-28 DIAGNOSIS — H35.341: ICD-10-CM

## 2025-05-28 PROCEDURE — 92134 CPTRZ OPH DX IMG PST SGM RTA: CPT | Performed by: OPHTHALMOLOGY

## 2025-05-28 PROCEDURE — 99211 OFF/OP EST MAY X REQ PHY/QHP: CPT | Performed by: OPHTHALMOLOGY

## 2025-05-28 ASSESSMENT — CONFRONTATIONAL VISUAL FIELD TEST (CVF)
OS_FINDINGS: FULL
OD_FINDINGS: FULL

## 2025-05-28 ASSESSMENT — LID EXAM ASSESSMENTS
OD_BLEPHARITIS: RLL T
OS_BLEPHARITIS: LLL T

## 2025-05-28 ASSESSMENT — REFRACTION_AUTOREFRACTION
OS_AXIS: 096
OS_CYLINDER: -0.50
OD_CYLINDER: -0.50
OD_SPHERE: 0.00
OS_SPHERE: +0.25
OD_AXIS: 090

## 2025-05-28 ASSESSMENT — KERATOMETRY
METHOD_AUTO_MANUAL: AUTO
OD_AXISANGLE_DEGREES: 005
OS_K1POWER_DIOPTERS: 44.75
OS_AXISANGLE_DEGREES: 061
OD_K1POWER_DIOPTERS: 44.00
OS_K2POWER_DIOPTERS: 45.00
OD_K2POWER_DIOPTERS: 44.25

## 2025-05-28 ASSESSMENT — SUPERFICIAL PUNCTATE KERATITIS (SPK)
OD_SPK: T
OS_SPK: T

## 2025-05-28 ASSESSMENT — CORNEAL PTERYGIUM
OS_PTERYGIUM: 3MM
OD_PTERYGIUM: 2MM

## 2025-05-28 ASSESSMENT — VISUAL ACUITY
OD_BCVA: 20/40
OS_BCVA: 20/150

## 2025-07-25 ENCOUNTER — APPOINTMENT (OUTPATIENT)
Dept: CARDIOLOGY | Facility: CLINIC | Age: 80
End: 2025-07-25
Payer: MEDICARE

## 2025-07-25 VITALS
HEART RATE: 67 BPM | BODY MASS INDEX: 39.08 KG/M2 | HEIGHT: 61 IN | SYSTOLIC BLOOD PRESSURE: 130 MMHG | WEIGHT: 207 LBS | RESPIRATION RATE: 16 BRPM | DIASTOLIC BLOOD PRESSURE: 80 MMHG

## 2025-07-25 DIAGNOSIS — E11.9 TYPE 2 DIABETES MELLITUS W/OUT COMPLICATIONS: ICD-10-CM

## 2025-07-25 DIAGNOSIS — I10 ESSENTIAL (PRIMARY) HYPERTENSION: ICD-10-CM

## 2025-07-25 DIAGNOSIS — I35.0 NONRHEUMATIC AORTIC (VALVE) STENOSIS: ICD-10-CM

## 2025-07-25 DIAGNOSIS — E78.5 HYPERLIPIDEMIA, UNSPECIFIED: ICD-10-CM

## 2025-07-25 PROCEDURE — G2211 COMPLEX E/M VISIT ADD ON: CPT

## 2025-07-25 PROCEDURE — 93000 ELECTROCARDIOGRAM COMPLETE: CPT

## 2025-07-25 PROCEDURE — 99214 OFFICE O/P EST MOD 30 MIN: CPT
